# Patient Record
Sex: FEMALE | Race: WHITE | NOT HISPANIC OR LATINO | Employment: FULL TIME | URBAN - METROPOLITAN AREA
[De-identification: names, ages, dates, MRNs, and addresses within clinical notes are randomized per-mention and may not be internally consistent; named-entity substitution may affect disease eponyms.]

---

## 2023-08-10 ENCOUNTER — HOSPITAL ENCOUNTER (OUTPATIENT)
Dept: DATA CONVERSION | Facility: HOSPITAL | Age: 58
Discharge: HOME | End: 2023-08-10

## 2023-08-10 DIAGNOSIS — E03.9 HYPOTHYROIDISM, UNSPECIFIED: ICD-10-CM

## 2023-08-10 DIAGNOSIS — Z00.00 ENCOUNTER FOR GENERAL ADULT MEDICAL EXAMINATION WITHOUT ABNORMAL FINDINGS: ICD-10-CM

## 2023-08-10 LAB
ALBUMIN SERPL-MCNC: 4.4 GM/DL (ref 3.5–5)
ALBUMIN/GLOB SERPL: 1.6 RATIO (ref 1.5–3)
ALP BLD-CCNC: 119 U/L (ref 35–125)
ALT SERPL-CCNC: 35 U/L (ref 5–40)
ANION GAP SERPL CALCULATED.3IONS-SCNC: 11 MMOL/L (ref 0–19)
APPEARANCE PLAS: CLEAR
AST SERPL-CCNC: 24 U/L (ref 5–40)
BASOPHILS # BLD AUTO: 0.03 K/UL (ref 0–0.22)
BASOPHILS NFR BLD AUTO: 0.5 % (ref 0–1)
BILIRUB SERPL-MCNC: 0.5 MG/DL (ref 0.1–1.2)
BUN SERPL-MCNC: 17 MG/DL (ref 8–25)
BUN/CREAT SERPL: 21.3 RATIO (ref 8–21)
CALCIUM SERPL-MCNC: 9.9 MG/DL (ref 8.5–10.4)
CHLORIDE SERPL-SCNC: 106 MMOL/L (ref 97–107)
CHOLEST SERPL-MCNC: 250 MG/DL (ref 133–200)
CHOLEST/HDLC SERPL: 3.2 RATIO
CO2 SERPL-SCNC: 24 MMOL/L (ref 24–31)
COLOR SPUN FLD: YELLOW
CREAT SERPL-MCNC: 0.8 MG/DL (ref 0.4–1.6)
DEPRECATED RDW RBC AUTO: 42 FL (ref 37–54)
DIFFERENTIAL METHOD BLD: ABNORMAL
EOSINOPHIL # BLD AUTO: 0.28 K/UL (ref 0–0.45)
EOSINOPHIL NFR BLD: 4.9 % (ref 0–3)
ERYTHROCYTE [DISTWIDTH] IN BLOOD BY AUTOMATED COUNT: 12.9 % (ref 11.7–15)
FASTING STATUS PATIENT QL REPORTED: ABNORMAL
GFR SERPL CREATININE-BSD FRML MDRD: 86 ML/MIN/1.73 M2
GLOBULIN SER-MCNC: 2.7 G/DL (ref 1.9–3.7)
GLUCOSE SERPL-MCNC: 96 MG/DL (ref 65–99)
HCT VFR BLD AUTO: 38.9 % (ref 36–44)
HDLC SERPL-MCNC: 79 MG/DL
HGB BLD-MCNC: 13.1 GM/DL (ref 12–15)
IMM GRANULOCYTES # BLD AUTO: 0.01 K/UL (ref 0–0.1)
LDLC SERPL CALC-MCNC: 146 MG/DL (ref 65–130)
LYMPHOCYTES # BLD AUTO: 1.26 K/UL (ref 1.2–3.2)
LYMPHOCYTES NFR BLD MANUAL: 21.9 % (ref 20–40)
MCH RBC QN AUTO: 29.9 PG (ref 26–34)
MCHC RBC AUTO-ENTMCNC: 33.7 % (ref 31–37)
MCV RBC AUTO: 88.8 FL (ref 80–100)
MONOCYTES # BLD AUTO: 0.37 K/UL (ref 0–0.8)
MONOCYTES NFR BLD MANUAL: 6.4 % (ref 0–8)
NEUTROPHILS # BLD AUTO: 3.81 K/UL
NEUTROPHILS # BLD AUTO: 3.81 K/UL (ref 1.8–7.7)
NEUTROPHILS.IMMATURE NFR BLD: 0.2 % (ref 0–1)
NEUTS SEG NFR BLD: 66.1 % (ref 50–70)
NRBC BLD-RTO: 0 /100 WBC
PLATELET # BLD AUTO: 250 K/UL (ref 150–450)
PMV BLD AUTO: 9.5 CU (ref 7–12.6)
POTASSIUM SERPL-SCNC: 4.4 MMOL/L (ref 3.4–5.1)
PROT SERPL-MCNC: 7.1 G/DL (ref 5.9–7.9)
RBC # BLD AUTO: 4.38 M/UL (ref 4–4.9)
SODIUM SERPL-SCNC: 141 MMOL/L (ref 133–145)
TRIGL SERPL-MCNC: 127 MG/DL (ref 40–150)
TSH SERPL DL<=0.05 MIU/L-ACNC: 1.49 MIU/L (ref 0.27–4.2)
WBC # BLD AUTO: 5.8 K/UL (ref 4.5–11)

## 2023-08-12 ENCOUNTER — HOSPITAL ENCOUNTER (OUTPATIENT)
Dept: DATA CONVERSION | Facility: HOSPITAL | Age: 58
Discharge: HOME | End: 2023-08-12

## 2023-08-12 DIAGNOSIS — Z00.00 ENCOUNTER FOR GENERAL ADULT MEDICAL EXAMINATION WITHOUT ABNORMAL FINDINGS: ICD-10-CM

## 2023-08-12 LAB
BACTERIA UR QL AUTO: NEGATIVE
BILIRUB UR QL STRIP.AUTO: NEGATIVE
CLARITY UR: CLEAR
COLOR UR: ABNORMAL
GLUCOSE UR STRIP.AUTO-MCNC: NEGATIVE MG/DL
HGB UR QL STRIP.AUTO: 3 /HPF (ref 0–3)
HGB UR QL: NEGATIVE
HYALINE CASTS UR QL AUTO: ABNORMAL /LPF
KETONES UR QL STRIP.AUTO: NEGATIVE
LEUKOCYTE ESTERASE UR QL STRIP.AUTO: ABNORMAL
MICROSCOPIC (UA): ABNORMAL
NITRITE UR QL STRIP.AUTO: NEGATIVE
PH UR STRIP.AUTO: 6.5 [PH] (ref 4.6–8)
PROT UR STRIP.AUTO-MCNC: NEGATIVE MG/DL
SP GR UR STRIP.AUTO: 1.01 (ref 1–1.03)
SQUAMOUS UR QL AUTO: ABNORMAL /HPF
UROBILINOGEN UR QL STRIP.AUTO: NORMAL MG/DL (ref 0–1)
WBC #/AREA URNS AUTO: 14 /HPF (ref 0–3)

## 2023-08-21 ENCOUNTER — HOSPITAL ENCOUNTER (OUTPATIENT)
Dept: DATA CONVERSION | Facility: HOSPITAL | Age: 58
End: 2023-08-21

## 2023-08-29 PROBLEM — M25.579 ANKLE PAIN: Status: ACTIVE | Noted: 2023-08-29

## 2023-08-29 PROBLEM — G47.33 OSA (OBSTRUCTIVE SLEEP APNEA): Status: ACTIVE | Noted: 2022-09-08

## 2023-08-29 PROBLEM — M84.30XA STRESS FRACTURE: Status: ACTIVE | Noted: 2023-08-29

## 2023-08-29 PROBLEM — L30.9 DERMATITIS: Status: ACTIVE | Noted: 2023-08-29

## 2023-08-29 PROBLEM — M85.80 OSTEOPENIA: Status: ACTIVE | Noted: 2023-08-29

## 2023-08-29 PROBLEM — M19.072 ARTHRITIS OF LEFT FOOT: Status: ACTIVE | Noted: 2021-06-10

## 2023-08-29 PROBLEM — M79.89 RIGHT LEG SWELLING: Status: ACTIVE | Noted: 2022-12-07

## 2023-08-29 PROBLEM — L84 CALLUS OF FOOT: Status: ACTIVE | Noted: 2021-01-21

## 2023-08-29 PROBLEM — N60.01 CYST OF RIGHT BREAST: Status: ACTIVE | Noted: 2023-08-29

## 2023-08-29 PROBLEM — R79.82 CRP ELEVATED: Status: ACTIVE | Noted: 2023-02-02

## 2023-08-29 PROBLEM — L82.1 SEBORRHEIC KERATOSES: Status: ACTIVE | Noted: 2022-08-15

## 2023-08-29 PROBLEM — L03.115 CELLULITIS OF RIGHT LOWER LEG: Status: ACTIVE | Noted: 2023-01-24

## 2023-08-29 PROBLEM — E03.9 HYPOTHYROIDISM: Status: ACTIVE | Noted: 2018-05-21

## 2023-08-29 PROBLEM — R52 BODY ACHES: Status: ACTIVE | Noted: 2023-01-24

## 2023-08-29 PROBLEM — R70.0 ELEVATED SED RATE: Status: ACTIVE | Noted: 2023-02-02

## 2023-08-29 PROBLEM — I87.2 VENOUS INSUFFICIENCY: Status: ACTIVE | Noted: 2018-05-21

## 2023-08-29 PROBLEM — K76.0 FATTY LIVER: Status: ACTIVE | Noted: 2023-08-29

## 2023-08-29 PROBLEM — E78.5 HYPERLIPIDEMIA: Status: ACTIVE | Noted: 2023-08-29

## 2023-08-29 PROBLEM — R53.83 FATIGUE: Status: ACTIVE | Noted: 2019-05-23

## 2023-08-29 PROBLEM — G47.00 INSOMNIA: Status: ACTIVE | Noted: 2020-05-28

## 2023-08-29 PROBLEM — M54.42 LUMBAGO WITH SCIATICA, LEFT SIDE: Status: ACTIVE | Noted: 2018-05-21

## 2023-08-29 PROBLEM — M25.50 ARTHRALGIA: Status: ACTIVE | Noted: 2023-01-24

## 2023-08-29 PROBLEM — L70.9 ACNE: Status: ACTIVE | Noted: 2020-02-04

## 2023-08-29 PROBLEM — E06.3 HASHIMOTO'S DISEASE: Status: ACTIVE | Noted: 2023-01-24

## 2023-08-29 PROBLEM — E55.9 VITAMIN D DEFICIENCY: Status: ACTIVE | Noted: 2023-08-29

## 2023-08-29 PROBLEM — R79.9 ABNORMAL BLOOD CHEMISTRY: Status: ACTIVE | Noted: 2023-08-29

## 2023-08-29 RX ORDER — LEVOTHYROXINE SODIUM 125 UG/1
125 TABLET ORAL
COMMUNITY
Start: 2013-01-17 | End: 2023-10-24

## 2023-08-29 RX ORDER — ERGOCALCIFEROL 1.25 MG/1
1.25 CAPSULE ORAL
COMMUNITY
Start: 2018-03-14

## 2023-08-29 RX ORDER — CALCIUM CARBONATE 200(500)MG
500 TABLET,CHEWABLE ORAL DAILY
COMMUNITY
Start: 2019-05-23 | End: 2023-12-07 | Stop reason: WASHOUT

## 2023-08-29 RX ORDER — BIOTIN 1 MG
1 TABLET ORAL DAILY
COMMUNITY

## 2023-10-28 ENCOUNTER — LAB (OUTPATIENT)
Dept: LAB | Facility: LAB | Age: 58
End: 2023-10-28
Payer: COMMERCIAL

## 2023-10-28 DIAGNOSIS — R76.8 OTHER SPECIFIED ABNORMAL IMMUNOLOGICAL FINDINGS IN SERUM: ICD-10-CM

## 2023-10-28 DIAGNOSIS — E55.9 VITAMIN D DEFICIENCY, UNSPECIFIED: Primary | ICD-10-CM

## 2023-10-28 DIAGNOSIS — R79.82 ELEVATED C-REACTIVE PROTEIN (CRP): ICD-10-CM

## 2023-10-28 DIAGNOSIS — L30.9 DERMATITIS, UNSPECIFIED: ICD-10-CM

## 2023-10-28 DIAGNOSIS — Z11.59 ENCOUNTER FOR SCREENING FOR OTHER VIRAL DISEASES: ICD-10-CM

## 2023-10-28 LAB
25(OH)D3 SERPL-MCNC: 51 NG/ML (ref 31–100)
ALBUMIN SERPL-MCNC: 4.6 G/DL (ref 3.5–5)
ALP BLD-CCNC: 136 U/L (ref 35–125)
ALT SERPL-CCNC: 57 U/L (ref 5–40)
ANION GAP SERPL CALC-SCNC: 13 MMOL/L
AST SERPL-CCNC: 34 U/L (ref 5–40)
BASOPHILS # BLD AUTO: 0.05 X10*3/UL (ref 0–0.1)
BASOPHILS NFR BLD AUTO: 0.9 %
BILIRUB SERPL-MCNC: 0.5 MG/DL (ref 0.1–1.2)
BUN SERPL-MCNC: 21 MG/DL (ref 8–25)
C3 SERPL-MCNC: 188 MG/DL (ref 87–200)
CALCIUM SERPL-MCNC: 10.1 MG/DL (ref 8.5–10.4)
CHLORIDE SERPL-SCNC: 102 MMOL/L (ref 97–107)
CK SERPL-CCNC: 85 U/L (ref 24–195)
CO2 SERPL-SCNC: 23 MMOL/L (ref 24–31)
CREAT SERPL-MCNC: 0.8 MG/DL (ref 0.4–1.6)
CRP SERPL-MCNC: 0.4 MG/DL (ref 0–2)
DAT-COMPLEMENT: NORMAL
EOSINOPHIL # BLD AUTO: 0.22 X10*3/UL (ref 0–0.7)
EOSINOPHIL NFR BLD AUTO: 4 %
ERYTHROCYTE [DISTWIDTH] IN BLOOD BY AUTOMATED COUNT: 12.7 % (ref 11.5–14.5)
ERYTHROCYTE [SEDIMENTATION RATE] IN BLOOD BY WESTERGREN METHOD: 27 MM/H (ref 0–30)
GFR SERPL CREATININE-BSD FRML MDRD: 86 ML/MIN/1.73M*2
GLUCOSE SERPL-MCNC: 100 MG/DL (ref 65–99)
HCT VFR BLD AUTO: 40.2 % (ref 36–46)
HCV AB SER QL: NONREACTIVE
HGB BLD-MCNC: 13.4 G/DL (ref 12–16)
HIV 1+2 AB+HIV1 P24 AG SERPL QL IA: NONREACTIVE
IMM GRANULOCYTES # BLD AUTO: 0.02 X10*3/UL (ref 0–0.7)
IMM GRANULOCYTES NFR BLD AUTO: 0.4 % (ref 0–0.9)
LYMPHOCYTES # BLD AUTO: 1.49 X10*3/UL (ref 1.2–4.8)
LYMPHOCYTES NFR BLD AUTO: 27 %
MCH RBC QN AUTO: 30.4 PG (ref 26–34)
MCHC RBC AUTO-ENTMCNC: 33.3 G/DL (ref 32–36)
MCV RBC AUTO: 91 FL (ref 80–100)
MONOCYTES # BLD AUTO: 0.43 X10*3/UL (ref 0.1–1)
MONOCYTES NFR BLD AUTO: 7.8 %
NEUTROPHILS # BLD AUTO: 3.31 X10*3/UL (ref 1.2–7.7)
NEUTROPHILS NFR BLD AUTO: 59.9 %
NRBC BLD-RTO: 0 /100 WBCS (ref 0–0)
PLATELET # BLD AUTO: 261 X10*3/UL (ref 150–450)
PMV BLD AUTO: 9.7 FL (ref 7.5–11.5)
POTASSIUM SERPL-SCNC: 4.4 MMOL/L (ref 3.4–5.1)
PROT SERPL-MCNC: 6.9 G/DL (ref 5.9–7.9)
PROT UR-ACNC: <4 MG/DL (ref 5–25)
RBC # BLD AUTO: 4.41 X10*6/UL (ref 4–5.2)
SODIUM SERPL-SCNC: 138 MMOL/L (ref 133–145)
THYROPEROXIDASE AB SERPL-ACNC: <28 IU/ML
URATE SERPL-MCNC: 5.1 MG/DL (ref 2.5–6.8)
WBC # BLD AUTO: 5.5 X10*3/UL (ref 4.4–11.3)

## 2023-10-28 PROCEDURE — 85652 RBC SED RATE AUTOMATED: CPT

## 2023-10-28 PROCEDURE — 82652 VIT D 1 25-DIHYDROXY: CPT

## 2023-10-28 PROCEDURE — 87350 HEPATITIS BE AG IA: CPT

## 2023-10-28 PROCEDURE — 86803 HEPATITIS C AB TEST: CPT

## 2023-10-28 PROCEDURE — 85025 COMPLETE CBC W/AUTO DIFF WBC: CPT

## 2023-10-28 PROCEDURE — 86225 DNA ANTIBODY NATIVE: CPT

## 2023-10-28 PROCEDURE — 82164 ANGIOTENSIN I ENZYME TEST: CPT

## 2023-10-28 PROCEDURE — 86036 ANCA SCREEN EACH ANTIBODY: CPT

## 2023-10-28 PROCEDURE — 36415 COLL VENOUS BLD VENIPUNCTURE: CPT

## 2023-10-28 PROCEDURE — 86160 COMPLEMENT ANTIGEN: CPT

## 2023-10-28 PROCEDURE — 86800 THYROGLOBULIN ANTIBODY: CPT

## 2023-10-28 PROCEDURE — 84166 PROTEIN E-PHORESIS/URINE/CSF: CPT

## 2023-10-28 PROCEDURE — 86146 BETA-2 GLYCOPROTEIN ANTIBODY: CPT

## 2023-10-28 PROCEDURE — 86618 LYME DISEASE ANTIBODY: CPT

## 2023-10-28 PROCEDURE — 86235 NUCLEAR ANTIGEN ANTIBODY: CPT

## 2023-10-28 PROCEDURE — 86258 DGP ANTIBODY EACH IG CLASS: CPT

## 2023-10-28 PROCEDURE — 86364 TISS TRNSGLTMNASE EA IG CLAS: CPT

## 2023-10-28 PROCEDURE — 86147 CARDIOLIPIN ANTIBODY EA IG: CPT

## 2023-10-28 PROCEDURE — 82550 ASSAY OF CK (CPK): CPT

## 2023-10-28 PROCEDURE — 86060 ANTISTREPTOLYSIN O TITER: CPT

## 2023-10-28 PROCEDURE — 84550 ASSAY OF BLOOD/URIC ACID: CPT

## 2023-10-28 PROCEDURE — 86880 COOMBS TEST DIRECT: CPT

## 2023-10-28 PROCEDURE — 86335 IMMUNFIX E-PHORSIS/URINE/CSF: CPT

## 2023-10-28 PROCEDURE — 87389 HIV-1 AG W/HIV-1&-2 AB AG IA: CPT

## 2023-10-28 PROCEDURE — 82306 VITAMIN D 25 HYDROXY: CPT

## 2023-10-28 PROCEDURE — 82784 ASSAY IGA/IGD/IGG/IGM EACH: CPT

## 2023-10-28 PROCEDURE — 89240 UNLISTED MISC PATH TEST: CPT | Performed by: INTERNAL MEDICINE

## 2023-10-28 PROCEDURE — 86140 C-REACTIVE PROTEIN: CPT

## 2023-10-28 PROCEDURE — 86325 OTHER IMMUNOELECTROPHORESIS: CPT

## 2023-10-28 PROCEDURE — 84156 ASSAY OF PROTEIN URINE: CPT

## 2023-10-28 PROCEDURE — 80053 COMPREHEN METABOLIC PANEL: CPT

## 2023-10-28 PROCEDURE — 86376 MICROSOMAL ANTIBODY EACH: CPT

## 2023-10-29 LAB
ASO AB SERPL-ACNC: 187 IU/ML (ref 0–250)
B2 GLYCOPROT1 IGA SER-ACNC: <0.6 U/ML
B2 GLYCOPROT1 IGG SER-ACNC: <1.4 U/ML
B2 GLYCOPROT1 IGM SER-ACNC: 1.2 U/ML
CARDIOLIPIN IGA SERPL-ACNC: <0.5 APL U/ML
CARDIOLIPIN IGG SER IA-ACNC: <1.6 GPL U/ML
CARDIOLIPIN IGM SER IA-ACNC: 0.8 MPL U/ML
DSDNA AB SER-ACNC: <1 IU/ML
ENA SM AB SER IA-ACNC: <0.2 AI
GLIADIN PEPTIDE IGA SER IA-ACNC: <1 U/ML
GLIADIN PEPTIDE IGG SER IA-ACNC: <1 U/ML
IGG SERPL-MCNC: 873 MG/DL (ref 700–1600)
IGG1 SER-MCNC: 652 MG/DL (ref 490–1140)
IGG2 SER-MCNC: 275 MG/DL (ref 150–640)
IGG3 SER-MCNC: 32 MG/DL (ref 11–85)
IGG4 SER-MCNC: 47 MG/DL (ref 3–200)
TTG IGA SER IA-ACNC: <1 U/ML
TTG IGG SER IA-ACNC: <1 U/ML

## 2023-10-30 NOTE — ADDENDUM NOTE
Addended by: MIMI CAMPBELL on: 10/30/2023 12:12 AM     Modules accepted: Orders     Patient believes she has a sinus infection. She would like something called in to provide relief.

## 2023-10-31 LAB
ACE SERPL-CCNC: 36 U/L (ref 16–85)
HBV E AG SERPL QL IA: NEGATIVE
THYROGLOB AB SERPL-ACNC: <0.9 IU/ML (ref 0–4)

## 2023-11-01 LAB
1,25(OH)2D3 SERPL-MCNC: 83.4 PG/ML (ref 19.9–79.3)
ANCA AB PATTERN SER IF-IMP: NORMAL
ANCA IGG TITR SER IF: NORMAL {TITER}
B BURGDOR.VLSE1+PEPC10 AB SER IA-ACNC: 0.26 IV

## 2023-11-02 ENCOUNTER — HOSPITAL ENCOUNTER (OUTPATIENT)
Dept: RESPIRATORY THERAPY | Facility: HOSPITAL | Age: 58
Setting detail: THERAPIES SERIES
Discharge: STILL ADMITTED OR ADMINISTRATIVE | End: 2023-11-02
Payer: COMMERCIAL

## 2023-11-02 ENCOUNTER — HOSPITAL ENCOUNTER (OUTPATIENT)
Dept: CARDIOLOGY | Facility: HOSPITAL | Age: 58
Discharge: HOME | End: 2023-11-02
Payer: COMMERCIAL

## 2023-11-02 DIAGNOSIS — R79.82 CRP ELEVATED: ICD-10-CM

## 2023-11-02 DIAGNOSIS — R76.8 POSITIVE SM/RNP ANTIBODY: ICD-10-CM

## 2023-11-02 DIAGNOSIS — R79.82 ELEVATED C-REACTIVE PROTEIN (CRP): ICD-10-CM

## 2023-11-02 DIAGNOSIS — R94.31 ABNORMAL ELECTROCARDIOGRAM (ECG) (EKG): ICD-10-CM

## 2023-11-02 DIAGNOSIS — L03.90 CELLULITIS, UNSPECIFIED CELLULITIS SITE: ICD-10-CM

## 2023-11-02 LAB — EJECTION FRACTION APICAL 4 CHAMBER: 53.3

## 2023-11-02 PROCEDURE — 93306 TTE W/DOPPLER COMPLETE: CPT | Performed by: INTERNAL MEDICINE

## 2023-11-02 PROCEDURE — 93306 TTE W/DOPPLER COMPLETE: CPT

## 2023-11-02 PROCEDURE — 94726 PLETHYSMOGRAPHY LUNG VOLUMES: CPT

## 2023-11-03 ENCOUNTER — OFFICE VISIT (OUTPATIENT)
Dept: PRIMARY CARE | Facility: CLINIC | Age: 58
End: 2023-11-03
Payer: COMMERCIAL

## 2023-11-03 VITALS
DIASTOLIC BLOOD PRESSURE: 80 MMHG | WEIGHT: 219 LBS | HEART RATE: 86 BPM | BODY MASS INDEX: 32.34 KG/M2 | OXYGEN SATURATION: 99 % | SYSTOLIC BLOOD PRESSURE: 122 MMHG

## 2023-11-03 DIAGNOSIS — L98.9 SKIN LESION: Primary | ICD-10-CM

## 2023-11-03 LAB
ALBUMIN MFR UR ELPH: 50.6 %
ALPHA1 GLOB MFR UR ELPH: 14.9 %
ALPHA2 GLOB MFR UR ELPH: 10 %
B-GLOBULIN MFR UR ELPH: 9.3 %
GAMMA GLOB MFR UR ELPH: 15.2 %
IMMUNOFIXATION COMMENT: NORMAL
PATH REVIEW - URINE IMMUNOFIXATION: NORMAL
PATH REVIEW-URINE PROTEIN ELECTROPHORESIS: NORMAL
URINE ELECTROPHORESIS COMMENT: NORMAL

## 2023-11-03 PROCEDURE — 1036F TOBACCO NON-USER: CPT | Performed by: STUDENT IN AN ORGANIZED HEALTH CARE EDUCATION/TRAINING PROGRAM

## 2023-11-03 PROCEDURE — 99213 OFFICE O/P EST LOW 20 MIN: CPT | Performed by: STUDENT IN AN ORGANIZED HEALTH CARE EDUCATION/TRAINING PROGRAM

## 2023-11-03 ASSESSMENT — PAIN SCALES - GENERAL: PAINLEVEL: 0-NO PAIN

## 2023-11-03 ASSESSMENT — PATIENT HEALTH QUESTIONNAIRE - PHQ9
SUM OF ALL RESPONSES TO PHQ9 QUESTIONS 1 AND 2: 0
1. LITTLE INTEREST OR PLEASURE IN DOING THINGS: NOT AT ALL
2. FEELING DOWN, DEPRESSED OR HOPELESS: NOT AT ALL

## 2023-11-03 NOTE — PROGRESS NOTES
GENERAL PROGRESS NOTE    Chief Complaint   Patient presents with    Spot on chest       HPI  Beverley Waggoner is a 58 y.o. female that presents today with a small red spot mid-chest. Slightly raised. No change since first noticing. No pain, itching, or irritation. Has had she thinks pre-cancerous lesions removed in the past, last a few years ago. Wanted to have looked at here before scheduling with dermatology.     Vital signs   /80   Pulse 86   Wt 99.3 kg (219 lb)   SpO2 99%   BMI 32.34 kg/m²      Current Outpatient Medications   Medication Sig Dispense Refill    biotin 1 mg tablet Take 1 tablet (1 mg) by mouth once daily.      CALCIUM CARBONATE-VITAMIN D3 ORAL Take 1 tablet by mouth once daily.      levothyroxine (Synthroid, Levoxyl) 125 mcg tablet TAKE 1 TABLET BY MOUTH DAILY 90 tablet 1    MAGNESIUM OXIDE ORAL Take by mouth once daily.      multivit-min/ferrous fumarate (MULTI VITAMIN ORAL) Take by mouth once daily.      pyridoxine HCl, vitamin B6, (VITAMIN B-6 ORAL) once daily.      calcium carbonate (Tums) 200 mg calcium chewable tablet Chew 1 tablet (500 mg) once daily.      ergocalciferol (Vitamin D-2) 1.25 MG (92095 UT) capsule Take 1 capsule (1,250 mcg) by mouth 1 (one) time per week.      fish oil concentrate (Omega-3) 120-180 mg capsule Take 1 capsule (1 g) by mouth once daily.      TURMERIC ORAL Take 500 mg by mouth once daily.       No current facility-administered medications for this visit.     PHYSICAL EXAM  Nursing notes and vitals reviewed  General appearance - AAOx3, non distressed  CVS - Warm and well perfused  RESP - No respiratory distress  PSYCH - Normal thought content, fluent and appropriate speech  SKIN - Mid chest with small approx 5mm circular, erythematous lesion. Very slightly risen. No scaling, no central clearing.     Assessment/Plan   1. Skin lesion  Reassured  does not have any concerning features but given her reported hx of pre-cancerous lesions, would recommend she see dermatology for skin check and to have this lesion looked at to be sure. Does not look like tinea given lack of central clearing. Does not seem to be eczema/dermatitis given absence of pruritus, history of similar, and its appearance. Will monitor without any recommended treatment at this time and get dermatology opinion if still present when she goes in for skin check.     Lashanda Ramirez MD  11/03/23  9:59 AM

## 2023-11-07 LAB — C1Q SERPL-MCNC: 14.7 MG/DL (ref 10.3–20.5)

## 2023-11-09 ENCOUNTER — APPOINTMENT (OUTPATIENT)
Dept: RHEUMATOLOGY | Facility: CLINIC | Age: 58
End: 2023-11-09
Payer: COMMERCIAL

## 2023-11-13 LAB — SCAN RESULT: NORMAL

## 2023-12-07 ENCOUNTER — OFFICE VISIT (OUTPATIENT)
Dept: RHEUMATOLOGY | Facility: CLINIC | Age: 58
End: 2023-12-07
Payer: COMMERCIAL

## 2023-12-07 VITALS
HEART RATE: 86 BPM | BODY MASS INDEX: 32.91 KG/M2 | OXYGEN SATURATION: 97 % | HEIGHT: 69 IN | WEIGHT: 222.22 LBS | SYSTOLIC BLOOD PRESSURE: 132 MMHG | DIASTOLIC BLOOD PRESSURE: 78 MMHG

## 2023-12-07 DIAGNOSIS — R76.8 POSITIVE SM/RNP ANTIBODY: ICD-10-CM

## 2023-12-07 DIAGNOSIS — M06.09 POLYARTHRITIS WITH NEGATIVE RHEUMATOID FACTOR (MULTI): ICD-10-CM

## 2023-12-07 DIAGNOSIS — M35.9 UNDIFFERENTIATED CONNECTIVE TISSUE DISEASE (MULTI): ICD-10-CM

## 2023-12-07 DIAGNOSIS — E55.9 VITAMIN D DEFICIENCY: ICD-10-CM

## 2023-12-07 DIAGNOSIS — B99.9 INFECTION: Primary | ICD-10-CM

## 2023-12-07 PROCEDURE — 99215 OFFICE O/P EST HI 40 MIN: CPT | Performed by: INTERNAL MEDICINE

## 2023-12-07 PROCEDURE — 1036F TOBACCO NON-USER: CPT | Performed by: INTERNAL MEDICINE

## 2023-12-07 RX ORDER — METHYLPREDNISOLONE 4 MG/1
TABLET ORAL
Qty: 21 TABLET | Refills: 0 | Status: SHIPPED | OUTPATIENT
Start: 2023-12-07

## 2023-12-07 RX ORDER — SULFAMETHOXAZOLE AND TRIMETHOPRIM 800; 160 MG/1; MG/1
1 TABLET ORAL 2 TIMES DAILY
Qty: 14 TABLET | Refills: 0 | Status: SHIPPED | OUTPATIENT
Start: 2023-12-07 | End: 2023-12-14

## 2023-12-07 ASSESSMENT — ROUTINE ASSESSMENT OF PATIENT INDEX DATA (RAPID3)
WALK_KILOMETERS: WITH SOME DIFFICULTY
FEELINGS_ANXIETY_NERVOUS: WITH SOME DIFFICULTY
WASH_DRY_BODY: WITHOUT ANY DIFFICULTY
ON A SCALE OF ONE TO TEN, HOW MUCH PAIN HAVE YOU HAD BECAUSE OF YOUR CONDITION OVER THE PAST WEEK?: 0.5
TOTAL RAPID3 SCORE: 2.5
ON A SCALE OF ONE TO TEN, HOW MUCH PAIN HAVE YOU HAD BECAUSE OF YOUR CONDITION OVER THE PAST WEEK?: 0.5
ON A SCALE OF ONE TO TEN, CONSIDERING ALL THE WAYS IN WHICH ILLNESS AND HEALTH CONDITIONS MAY AFFECT YOU AT THIS TIME, PLEASE INDICATE BELOW HOW YOU ARE DOING:: 1
SUM OF QUESTIONS A TO J: 3
PICK_CLOTHES_OFF_FLOOR: WITHOUT ANY DIFFICULTY
WEIGHTED_TOTAL_SCORE: 0.83
TURN_FAUCETS_OFF: WITHOUT ANY DIFFICULTY
LIFT_CUP_TO_MOUTH: WITHOUT ANY DIFFICULTY
SEVERITY_SCORE: 0
DRESS_YOURSELF: WITHOUT ANY DIFFICULTY
PARTIPATE_RECREATIONAL_ACTIVITIES: WITH SOME DIFFICULTY
FEELINGS_DEPRESSION: WITH SOME DIFFICULTY
GOOD_NIGHTS_SLEEP: WITH MUCH DIFFICULTY
IN_OUT_BED: WITHOUT ANY DIFFICULTY
IN_OUT_TRANSPORT: WITHOUT ANY DIFFICULTY
WALK_FLAT_GROUND: WITH SOME DIFFICULTY
ON A SCALE OF ONE TO TEN, CONSIDERING ALL THE WAYS IN WHICH ILLNESS AND HEALTH CONDITIONS MAY AFFECT YOU AT THIS TIME, PLEASE INDICATE BELOW HOW YOU ARE DOING:: 1
FN_SCORE: 1

## 2023-12-07 ASSESSMENT — ENCOUNTER SYMPTOMS
DEPRESSION: 0
OCCASIONAL FEELINGS OF UNSTEADINESS: 0
LOSS OF SENSATION IN FEET: 0

## 2023-12-07 ASSESSMENT — PATIENT HEALTH QUESTIONNAIRE - PHQ9
SUM OF ALL RESPONSES TO PHQ9 QUESTIONS 1 AND 2: 0
2. FEELING DOWN, DEPRESSED OR HOPELESS: NOT AT ALL
1. LITTLE INTEREST OR PLEASURE IN DOING THINGS: NOT AT ALL

## 2023-12-07 ASSESSMENT — PAIN - FUNCTIONAL ASSESSMENT: PAIN_FUNCTIONAL_ASSESSMENT: 0-10

## 2023-12-07 ASSESSMENT — PAIN SCALES - GENERAL: PAINLEVEL_OUTOF10: 1

## 2023-12-07 NOTE — PROGRESS NOTES
"          [facility]/  Rheumatology  [address], [address]  [place] [address]  Phone: [phone]  Fax: [phone]    Rheumatology Progress Note 12/7/23    Beverley Waggoner is a 58 y.o. female here for   Chief Complaint   Patient presents with    Follow-up     labs       Last Visit: 8/3/23    Rheum Hx      Previous Tx    Health Maintenance  DXA  Malignancy Hx-none  Immunization History   Administered Date(s) Administered    Tdap vaccine, age 7 year and older (BOOSTRIX) 05/21/2018    Zoster vaccine, recombinant, adult (SHINGRIX) 06/04/2021, 09/29/2021          Past Medical History:   Diagnosis Date    Hyperthyroidism     Vitamin D deficiency       Past Surgical History:   Procedure Laterality Date    FOOT TENDON TRANSFER Right     TOE SURGERY        Current Outpatient Medications   Medication Sig Dispense Refill    biotin 1 mg tablet Take 1 tablet (1 mg) by mouth once daily.      CALCIUM CARBONATE-VITAMIN D3 ORAL Take 1 tablet by mouth once daily.      ergocalciferol (Vitamin D-2) 1.25 MG (62130 UT) capsule Take 1 capsule (1,250 mcg) by mouth 1 (one) time per week.      levothyroxine (Synthroid, Levoxyl) 125 mcg tablet TAKE 1 TABLET BY MOUTH DAILY 90 tablet 1    MAGNESIUM OXIDE ORAL Take by mouth once daily.      multivit-min/ferrous fumarate (MULTI VITAMIN ORAL) Take by mouth once daily.      pyridoxine HCl, vitamin B6, (VITAMIN B-6 ORAL) once daily.      methylPREDNISolone (Medrol Dospak) 4 mg tablets Follow schedule on package instructions 21 tablet 0     No current facility-administered medications for this visit.      Allergies   Allergen Reactions    Adhesive Tape-Silicones Itching     Redness at site of strips from echo        Vitals:    12/07/23 1100   BP: 132/78   Pulse: 86   SpO2: 97%   Weight: 101 kg (222 lb 3.6 oz)   Height: 1.753 m (5' 9\")     Pain Assessment Pain Score: 1, Pain Location: Foot     Rapid 3  Function Score (FN): 1  Pain Score (PN) (0-10): 0.5  Patient Global " (PTGL) (0-10): 1  Rapid3 Score: 2.5  RAPID3 Weighted Score: 0.83     Workup  Component      Latest Ref Rng 10/28/2023   WBC      4.4 - 11.3 x10*3/uL 5.5    nRBC      0.0 - 0.0 /100 WBCs 0.0    RBC      4.00 - 5.20 x10*6/uL 4.41    HEMOGLOBIN      12.0 - 16.0 g/dL 13.4    HEMATOCRIT      36.0 - 46.0 % 40.2    MCV      80 - 100 fL 91    MCH      26.0 - 34.0 pg 30.4    MCHC      32.0 - 36.0 g/dL 33.3    RED CELL DISTRIBUTION WIDTH      11.5 - 14.5 % 12.7    Platelets      150 - 450 x10*3/uL 261    MEAN PLATELET VOLUME      7.5 - 11.5 fL 9.7    Neutrophils %      40.0 - 80.0 % 59.9    Immature Granulocytes %, Automated      0.0 - 0.9 % 0.4    Lymphocytes %      13.0 - 44.0 % 27.0    Monocytes %      2.0 - 10.0 % 7.8    Eosinophils %      0.0 - 6.0 % 4.0    Basophils %      0.0 - 2.0 % 0.9    Neutrophils Absolute      1.20 - 7.70 x10*3/uL 3.31    Immature Granulocytes Absolute, Automated      0.00 - 0.70 x10*3/uL 0.02    Lymphocytes Absolute      1.20 - 4.80 x10*3/uL 1.49    Monocytes Absolute      0.10 - 1.00 x10*3/uL 0.43    Eosinophils Absolute      0.00 - 0.70 x10*3/uL 0.22    Basophils Absolute      0.00 - 0.10 x10*3/uL 0.05    GLUCOSE      65 - 99 mg/dL 100 (H)    SODIUM      133 - 145 mmol/L 138    POTASSIUM      3.4 - 5.1 mmol/L 4.4    CHLORIDE      97 - 107 mmol/L 102    Bicarbonate      24 - 31 mmol/L 23 (L)    Blood Urea Nitrogen      8 - 25 mg/dL 21    Creatinine      0.40 - 1.60 mg/dL 0.80    EGFR      >60 mL/min/1.73m*2 86    Calcium      8.5 - 10.4 mg/dL 10.1    Albumin      3.5 - 5.0 g/dL 4.6    Alkaline Phosphatase      35 - 125 U/L 136 (H)    Total Protein      5.9 - 7.9 g/dL 6.9    AST      5 - 40 U/L 34    Bilirubin Total      0.1 - 1.2 mg/dL 0.5    ALT      5 - 40 U/L 57 (H)    Anion Gap      <=19 mmol/L 13    Albumin %      % 50.6    Alpha 1 Globulin %      % 14.9    Alpha 2 Globulin %      % 10.0    Beta Globulin %      % 9.3    Gamma Globulin %      % 15.2    Urine Electrophoresis Comment Normal.     Path Review-Urine Protein Electrophoresis  Reviewed and approved by ARIELLA MCDONALD on 11/3/23 at 8:09 AM.    Path Review - Urine Immunofixation Reviewed and approved by ARIELLA MCDONALD on 11/3/23 at 8:09 AM.    Immunofixation Comment No monoclonal protein detected by immunofixation.    IgG 1      490 - 1,140 mg/dL 652    IgG 2      150 - 640 mg/dL 275    IgG 3      11 - 85 mg/dL 32    IgG 4      3 - 200 mg/dL 47    IgG      700 - 1,600 mg/dL 873    Tissue Transglutaminase, IgA      <15.0 U/mL <1.0    Tissue Transglutamase IgG      <15.0 U/mL <1.0    Deamidated Gliadin Antibody IgA      <15.0 U/mL <1.0    Deamidated Gliadin Antibody IgG      <15.0 U/mL <1.0    Beta-2 Glyco 1 IgG      <20.0 U/mL <1.4    Beta-2 Glyco 1 IgA      <20.0 U/mL <0.6    Beta 2 Glyco 1 IgM      <20.0 U/mL 1.2    Anticardiolipin IgA      <20.0 APL U/mL <0.5    Anticardiolipin IgG      <20.0 GPL U/mL <1.6    Anticardiolipin IgM      <20.0 MPL U/mL 0.8    ANCA IFA Titer      <1:20  <1:20    ANCA IFA Pattern      None Detected  None Detected    Anti-SM      <1.0 AI <0.2    MARCELINO COMPLEMENT NEG    URIC ACID      2.5 - 6.8 mg/dL 5.1    Creatine Kinase      24 - 195 U/L 85    C1Q Complement      10.3 - 20.5 mg/dL 14.7    C3 Complement      87 - 200 mg/dL 188    Sed Rate      0 - 30 mm/h 27    Thyroid Peroxidase (TPO) Antibody      <=60 IU/mL <28    Anti-DNA (DS)      <5.0 IU/mL <1.0    C-Reactive Protein      0.00 - 2.00 mg/dL 0.40    HIV 1/2 Antigen/Antibody Screen with Reflex to Confirmation      Nonreactive  Nonreactive    Vit D, 1,25-Dihydroxy      19.9 - 79.3 pg/mL 83.4 (H)    Hepatitis C AB      Nonreactive  Nonreactive    Hep Be Antigen      Negative  Negative    B. burgdorferi VlsE1/pepC10 Abs, CARL      <=0.90 IV 0.26    Angio Converting Enzyme      16 - 85 U/L 36    Anti-Thyroglobulin AB      0.0 - 4.0 IU/mL <0.9    Vitamin D, 25-Hydroxy, Total      31 - 100 ng/mL 51    ASO      0 - 250 IU/mL 187    Total Protein, Urine      5 - 25 mg/dL <4  (L)    Extra Tube    Scan Result See Scanned Result      Component      Latest Ref Rng 10/30/2023   WBC      4.4 - 11.3 x10*3/uL    nRBC      0.0 - 0.0 /100 WBCs    RBC      4.00 - 5.20 x10*6/uL    HEMOGLOBIN      12.0 - 16.0 g/dL    HEMATOCRIT      36.0 - 46.0 %    MCV      80 - 100 fL    MCH      26.0 - 34.0 pg    MCHC      32.0 - 36.0 g/dL    RED CELL DISTRIBUTION WIDTH      11.5 - 14.5 %    Platelets      150 - 450 x10*3/uL    MEAN PLATELET VOLUME      7.5 - 11.5 fL    Neutrophils %      40.0 - 80.0 %    Immature Granulocytes %, Automated      0.0 - 0.9 %    Lymphocytes %      13.0 - 44.0 %    Monocytes %      2.0 - 10.0 %    Eosinophils %      0.0 - 6.0 %    Basophils %      0.0 - 2.0 %    Neutrophils Absolute      1.20 - 7.70 x10*3/uL    Immature Granulocytes Absolute, Automated      0.00 - 0.70 x10*3/uL    Lymphocytes Absolute      1.20 - 4.80 x10*3/uL    Monocytes Absolute      0.10 - 1.00 x10*3/uL    Eosinophils Absolute      0.00 - 0.70 x10*3/uL    Basophils Absolute      0.00 - 0.10 x10*3/uL    GLUCOSE      65 - 99 mg/dL    SODIUM      133 - 145 mmol/L    POTASSIUM      3.4 - 5.1 mmol/L    CHLORIDE      97 - 107 mmol/L    Bicarbonate      24 - 31 mmol/L    Blood Urea Nitrogen      8 - 25 mg/dL    Creatinine      0.40 - 1.60 mg/dL    EGFR      >60 mL/min/1.73m*2    Calcium      8.5 - 10.4 mg/dL    Albumin      3.5 - 5.0 g/dL    Alkaline Phosphatase      35 - 125 U/L    Total Protein      5.9 - 7.9 g/dL    AST      5 - 40 U/L    Bilirubin Total      0.1 - 1.2 mg/dL    ALT      5 - 40 U/L    Anion Gap      <=19 mmol/L    Albumin %      %    Alpha 1 Globulin %      %    Alpha 2 Globulin %      %    Beta Globulin %      %    Gamma Globulin %      %    Urine Electrophoresis Comment    Path Review-Urine Protein Electrophoresis     Path Review - Urine Immunofixation    Immunofixation Comment    IgG 1      490 - 1,140 mg/dL    IgG 2      150 - 640 mg/dL    IgG 3      11 - 85 mg/dL    IgG 4      3 - 200 mg/dL     IgG      700 - 1,600 mg/dL    Tissue Transglutaminase, IgA      <15.0 U/mL    Tissue Transglutamase IgG      <15.0 U/mL    Deamidated Gliadin Antibody IgA      <15.0 U/mL    Deamidated Gliadin Antibody IgG      <15.0 U/mL    Beta-2 Glyco 1 IgG      <20.0 U/mL    Beta-2 Glyco 1 IgA      <20.0 U/mL    Beta 2 Glyco 1 IgM      <20.0 U/mL    Anticardiolipin IgA      <20.0 APL U/mL    Anticardiolipin IgG      <20.0 GPL U/mL    Anticardiolipin IgM      <20.0 MPL U/mL    ANCA IFA Titer      <1:20     ANCA IFA Pattern      None Detected     Anti-SM      <1.0 AI    MARCELINO COMPLEMENT    URIC ACID      2.5 - 6.8 mg/dL    Creatine Kinase      24 - 195 U/L    C1Q Complement      10.3 - 20.5 mg/dL    C3 Complement      87 - 200 mg/dL    Sed Rate      0 - 30 mm/h    Thyroid Peroxidase (TPO) Antibody      <=60 IU/mL    Anti-DNA (DS)      <5.0 IU/mL    C-Reactive Protein      0.00 - 2.00 mg/dL    HIV 1/2 Antigen/Antibody Screen with Reflex to Confirmation      Nonreactive     Vit D, 1,25-Dihydroxy      19.9 - 79.3 pg/mL    Hepatitis C AB      Nonreactive     Hep Be Antigen      Negative     B. burgdorferi VlsE1/pepC10 Abs, CARL      <=0.90 IV    Angio Converting Enzyme      16 - 85 U/L    Anti-Thyroglobulin AB      0.0 - 4.0 IU/mL    Vitamin D, 25-Hydroxy, Total      31 - 100 ng/mL    ASO      0 - 250 IU/mL    Total Protein, Urine      5 - 25 mg/dL    Extra Tube Hold for add-ons. (P)   Extra Tube Hold for add-ons. (P)   Scan Result        MRN: [id number]  Patient Name: MARCIA WRIGHT     STUDY:  US LIVER; 12/29/2017 9:02 am     INDICATION:  Signs/Symptoms: Elevated liver enzymes.     COMPARISON:  None.     ACCESSION NUMBER(S):  [id number]     ORDERING CLINICIAN:  CRISTOFER HUGHES     TECHNIQUE:  Multiple images of the right upper quadrant were obtained. This  examination was interpreted at [facility].     FINDINGS:  LIVER:  Liver measures 16 cm in craniocaudal dimension. The echotexture of  liver is  slightly increased, likely due to fatty changes. There is no  mass or focal lesion.     GALLBLADDER:  The gallbladder is nondistended demonstrates no evidence of  gallstones, wall thickening or surrounding fluid. Sonographic  Marin's sign is negative.     BILIARY TREE:  No intra or extrahepatic biliary dilatation is identified. The common  bile duct measures 0.3 cm     PANCREAS:  The pancreas is poorly visualized due to overlying bowel gas.     RIGHT KIDNEY:  The right kidney is normal in size, measuring 10 cm in craniocaudal  dimension. The renal cortical echogenicity and thickness are within  normal limits. No hydronephrosis or renal calculi are seen.     IMPRESSION:  Hepatic steatosis and no biliary dilation.        I personally reviewed the images/study and I agree with the findings  as stated. This study was interpreted at [place], [place].    TRANSTHORACIC ECHOCARDIOGRAM REPORT        Patient Name:      MARCIA Hernández Physician:    [id number] James Sena DO  Study Date:        11/2/2023           Ordering Provider:    [id number] JAMES SENA  MRN/PID:           [id number]            Fellow:  Accession#:        [id number]        Nurse:  Date of Birth/Age: 1965 / 58      Sonographer:          James Leiva RDCS                     years  Gender:            F                   Additional Staff:  Height:            174.00 cm           Admit Date:  Weight:            100.00 kg           Admission Status:     Outpatient  BSA:               2.14 m2             Department Location:  [facility]  Blood Pressure: 138 /78 mmHg     Study Type:    TRANSTHORACIC ECHO (TTE) COMPLETE  Diagnosis/ICD: Abnormal electrocardiogram [ECG] [EKG]-R94.31  Indication:    Abnormal EKG  CPT Codes:     Echo Complete w Full Doppler-[id number]     Patient History:  Pertinent History: HTN, LE Edema, Hyperlipidemia and Murmur.      Study Detail: The following Echo studies were performed: 2D, M-Mode, Doppler and                color flow.        PHYSICIAN INTERPRETATION:  Left Ventricle: Left ventricular systolic function is normal, with an estimated ejection fraction of 55-60%. There are no regional wall motion abnormalities. The left ventricular cavity size is normal. Spectral Doppler shows a normal pattern of left ventricular diastolic filling.  Left Atrium: The left atrium is normal in size.  Right Ventricle: The right ventricle is normal in size. There is normal right ventricular global systolic function.  Right Atrium: The right atrium is normal in size.  Aortic Valve: The aortic valve is trileaflet. There is no evidence of aortic valve regurgitation. The peak instantaneous gradient of the aortic valve is 8.0 mmHg. The mean gradient of the aortic valve is 4.0 mmHg.  Mitral Valve: The mitral valve is normal in structure. There is no evidence of mitral valve regurgitation.  Tricuspid Valve: The tricuspid valve is structurally normal. There is mild tricuspid regurgitation.  Pulmonic Valve: The pulmonic valve is structurally normal. There is trace pulmonic valve regurgitation.  Pericardium: There is no pericardial effusion noted.  Aorta: The aortic root is abnormal. There is mild dilatation of the ascending aorta.        CONCLUSIONS:   1. Left ventricular systolic function is normal with a 55-60% estimated ejection fraction.   2. Mild tricuspid regurgitation is visualized.   3. Mild dilation ascending aorta, diameter 3.9 cm.     QUANTITATIVE DATA SUMMARY:  2D MEASUREMENTS:                           Normal Ranges:  IVSd:          0.91 cm   (0.6-1.1cm)  LVPWd:         0.96 cm   (0.6-1.1cm)  LVIDd:         4.62 cm   (3.9-5.9cm)  LVIDs:         3.34 cm  LV Mass Index: 68.1 g/m2  LV % FS        27.7 %     LA VOLUME:                                Normal Ranges:  LA Vol A4C:        68.0 ml    (22+/-6mL/m2)  LA Vol A2C:        73.5 ml  LA Vol BP:          74.8 ml  LA Vol Index A4C:  31.8ml/m2  LA Vol Index A2C:  34.3 ml/m2  LA Vol Index BP:   34.9 ml/m2  LA Area A4C:       20.0 cm2  LA Area A2C:       22.0 cm2  LA Major Axis A4C: 5.0 cm  LA Major Axis A2C: 5.6 cm  LA Volume Index:   34.0 ml/m2  LA Vol A4C:        63.0 ml  LA Vol A2C:        70.0 ml     RA VOLUME BY A/L METHOD:                        Normal Ranges:  RA Area A4C: 15.0 cm2     M-MODE MEASUREMENTS:                   Normal Ranges:  Ao Root: 3.40 cm (2.0-3.7cm)  LAs:     4.10 cm (2.7-4.0cm)     AORTA MEASUREMENTS:                     Normal Ranges:  Asc Ao, d: 3.90 cm (2.1-3.4cm)     LV SYSTOLIC FUNCTION BY 2D PLANIMETRY (MOD):                      Normal Ranges:  EF-A4C View: 53.3 % (>=55%)  EF-A2C View: 57.5 %  EF-Biplane:  55.5 %     LV DIASTOLIC FUNCTION:                         Normal Ranges:  MV Peak E:    0.72 m/s (0.7-1.2 m/s)  MV Peak A:    0.95 m/s (0.42-0.7 m/s)  E/A Ratio:    0.76     (1.0-2.2)  MV lateral e' 0.09 m/s  MV medial e'  0.05 m/s     MITRAL VALVE:                  Normal Ranges:  MV DT: 134 msec (150-240msec)     AORTIC VALVE:                                    Normal Ranges:  AoV Vmax:                1.41 m/s (<=1.7m/s)  AoV Peak P.0 mmHg (<20mmHg)  AoV Mean P.0 mmHg (1.7-11.5mmHg)  LVOT Max Niles:            1.00 m/s (<=1.1m/s)  AoV VTI:                 32.30 cm (18-25cm)  LVOT VTI:                21.70 cm  LVOT Diameter:           2.10 cm  (1.8-2.4cm)  AoV Area, VTI:           2.33 cm2 (2.5-5.5cm2)  AoV Area,Vmax:           2.45 cm2 (2.5-4.5cm2)  AoV Dimensionless Index: 0.67        RIGHT VENTRICLE:  RV Basal 3.97 cm  RV Mid   2.99 cm  RV Major 6.8 cm  TAPSE:   31.7 mm  RV s'    0.11 m/s     TRICUSPID VALVE/RVSP:                    Normal Ranges:  IVC Diam: 1.43 cm     PULMONIC VALVE:                       Normal Ranges:  PV Max Niles: 1.0 m/s  (0.6-0.9m/s)  PV Max PG:  3.8 mmHg        [id number] [facility]  Electronically signed on 2023 at  11:37:09 AM           PFT Interpretation     Quality of Data:  The patient's efforts are acceptable and reproducible.     Test Performed:  Complete pulmonary function tests, including spirometry with and without bronchodilator, measurement of lung volume, and diffusing capacity     Spirometry Interpretation:  Spirometry is within normal limits.     Flow Volume Loops:  Flow-Volume loops are normal.     Lung Volumes:  Plethysmographic lung volumes are within normal limits.     Diffusing Capacity:  Diffusing capacity of the lung is normal.     Conclusions:  Pulmonary function testing is within normal limits.        Assessment/Plan  1. Infection    2. Undifferentiated connective tissue disease (CMS/HCC)    3. Positive sm/RNP antibody    4. Polyarthritis with negative rheumatoid factor (CMS/HCC)    5. Vitamin D deficiency       Orders Placed This Encounter   Procedures    REYNALDO + NURYS Panel    Anti-DNA Antibody, Double-Stranded    C3 Complement    C4 Complement    CBC and Auto Differential    C-Reactive Protein    Creatine Kinase    Sedimentation Rate    Urinalysis with Reflex Microscopic and Culture    T-Spot TB    [facility]; [facility]; [id number] - Miscellaneous Test    Vitamin D 25-Hydroxy,Total (for eval of Vitamin D levels)    Vitamin D 1,25 Dihydroxy (for eval of hypercalcemia)    Hepatitis B Surface Antigen        Workup reviewed and d/w pt.  At this point no clear indication/need for immunosuppressivi or DMARD.  Baseline workup including complete PFTs wnl; Echo shows mild TR and mildly dilated ascending aorta- monitor.  Tx current infection. Hydrate well and have yogurt and/or probiotic daily.  Monitor yearly or earlier if concerning new sx for inflammation/autoimmune disease.      Advised of possible side effects and importance of monitoring.   All questions answered.  Patient to follow up with primary care provider regarding all other medical issues not addressed today.     Camila Hannah MD      Patient Care  Team:  JEAN PAUL Abraham as PCP - [facility] PCP  JEAN PAUL Abraham as Primary Care Provider  Camila Hannah MD as Consulting Physician (Rheumatology)

## 2023-12-13 LAB
HOLD SPECIMEN: NORMAL
HOLD SPECIMEN: NORMAL

## 2024-04-16 DIAGNOSIS — E03.9 HYPOTHYROIDISM, UNSPECIFIED TYPE: ICD-10-CM

## 2024-04-16 RX ORDER — LEVOTHYROXINE SODIUM 125 UG/1
125 TABLET ORAL DAILY
Qty: 90 TABLET | Refills: 1 | Status: SHIPPED | OUTPATIENT
Start: 2024-04-16

## 2024-07-10 ENCOUNTER — TELEPHONE (OUTPATIENT)
Dept: PRIMARY CARE | Facility: CLINIC | Age: 59
End: 2024-07-10
Payer: COMMERCIAL

## 2024-07-10 DIAGNOSIS — E03.9 HYPOTHYROIDISM, UNSPECIFIED TYPE: ICD-10-CM

## 2024-07-10 DIAGNOSIS — E78.5 HYPERLIPIDEMIA, UNSPECIFIED HYPERLIPIDEMIA TYPE: ICD-10-CM

## 2024-07-10 DIAGNOSIS — Z00.00 ANNUAL PHYSICAL EXAM: Primary | ICD-10-CM

## 2024-08-22 ENCOUNTER — LAB (OUTPATIENT)
Dept: LAB | Facility: LAB | Age: 59
End: 2024-08-22
Payer: COMMERCIAL

## 2024-08-22 DIAGNOSIS — M35.9 UNDIFFERENTIATED CONNECTIVE TISSUE DISEASE (MULTI): ICD-10-CM

## 2024-08-22 DIAGNOSIS — E78.5 HYPERLIPIDEMIA, UNSPECIFIED HYPERLIPIDEMIA TYPE: ICD-10-CM

## 2024-08-22 DIAGNOSIS — E03.9 HYPOTHYROIDISM, UNSPECIFIED TYPE: ICD-10-CM

## 2024-08-22 DIAGNOSIS — M06.09 POLYARTHRITIS WITH NEGATIVE RHEUMATOID FACTOR (MULTI): ICD-10-CM

## 2024-08-22 DIAGNOSIS — Z00.00 ANNUAL PHYSICAL EXAM: ICD-10-CM

## 2024-08-22 DIAGNOSIS — E55.9 VITAMIN D DEFICIENCY: ICD-10-CM

## 2024-08-22 LAB
25(OH)D3 SERPL-MCNC: 50 NG/ML (ref 31–100)
ALBUMIN SERPL-MCNC: 4.5 G/DL (ref 3.5–5)
ALP BLD-CCNC: 129 U/L (ref 35–125)
ALT SERPL-CCNC: 37 U/L (ref 5–40)
ANION GAP SERPL CALC-SCNC: 10 MMOL/L
APPEARANCE UR: CLEAR
AST SERPL-CCNC: 25 U/L (ref 5–40)
BASOPHILS # BLD AUTO: 0.05 X10*3/UL (ref 0–0.1)
BASOPHILS NFR BLD AUTO: 1 %
BILIRUB SERPL-MCNC: 0.6 MG/DL (ref 0.1–1.2)
BILIRUB UR STRIP.AUTO-MCNC: NEGATIVE MG/DL
BUN SERPL-MCNC: 22 MG/DL (ref 8–25)
C3 SERPL-MCNC: 177 MG/DL (ref 87–200)
C4 SERPL-MCNC: 46 MG/DL (ref 10–50)
CALCIUM SERPL-MCNC: 9.7 MG/DL (ref 8.5–10.4)
CENTROMERE B AB SER-ACNC: <0.2 AI
CHLORIDE SERPL-SCNC: 106 MMOL/L (ref 97–107)
CHOLEST SERPL-MCNC: 249 MG/DL (ref 133–200)
CHOLEST/HDLC SERPL: 3.2 {RATIO}
CHROMATIN AB SERPL-ACNC: <0.2 AI
CK SERPL-CCNC: 99 U/L (ref 24–195)
CO2 SERPL-SCNC: 25 MMOL/L (ref 24–31)
COLOR UR: NORMAL
CREAT SERPL-MCNC: 0.9 MG/DL (ref 0.4–1.6)
CRP SERPL-MCNC: 0.4 MG/DL (ref 0–2)
DSDNA AB SER-ACNC: <1 IU/ML
EGFRCR SERPLBLD CKD-EPI 2021: 74 ML/MIN/1.73M*2
ENA JO1 AB SER QL IA: <0.2 AI
ENA RNP AB SER IA-ACNC: 0.5 AI
ENA SCL70 AB SER QL IA: <0.2 AI
ENA SM AB SER IA-ACNC: <0.2 AI
ENA SM+RNP AB SER QL IA: <0.2 AI
ENA SS-A AB SER IA-ACNC: <0.2 AI
ENA SS-B AB SER IA-ACNC: <0.2 AI
EOSINOPHIL # BLD AUTO: 0.3 X10*3/UL (ref 0–0.7)
EOSINOPHIL NFR BLD AUTO: 5.8 %
ERYTHROCYTE [DISTWIDTH] IN BLOOD BY AUTOMATED COUNT: 12.7 % (ref 11.5–14.5)
ERYTHROCYTE [SEDIMENTATION RATE] IN BLOOD BY WESTERGREN METHOD: 19 MM/H (ref 0–30)
GLUCOSE SERPL-MCNC: 96 MG/DL (ref 65–99)
GLUCOSE UR STRIP.AUTO-MCNC: NORMAL MG/DL
HBV SURFACE AG SERPL QL IA: NONREACTIVE
HCT VFR BLD AUTO: 38.3 % (ref 36–46)
HDLC SERPL-MCNC: 78 MG/DL
HGB BLD-MCNC: 13 G/DL (ref 12–16)
IMM GRANULOCYTES # BLD AUTO: 0.01 X10*3/UL (ref 0–0.7)
IMM GRANULOCYTES NFR BLD AUTO: 0.2 % (ref 0–0.9)
KETONES UR STRIP.AUTO-MCNC: NEGATIVE MG/DL
LDLC SERPL CALC-MCNC: 153 MG/DL (ref 65–130)
LEUKOCYTE ESTERASE UR QL STRIP.AUTO: NEGATIVE
LYMPHOCYTES # BLD AUTO: 1.5 X10*3/UL (ref 1.2–4.8)
LYMPHOCYTES NFR BLD AUTO: 29.2 %
MCH RBC QN AUTO: 30.4 PG (ref 26–34)
MCHC RBC AUTO-ENTMCNC: 33.9 G/DL (ref 32–36)
MCV RBC AUTO: 90 FL (ref 80–100)
MONOCYTES # BLD AUTO: 0.4 X10*3/UL (ref 0.1–1)
MONOCYTES NFR BLD AUTO: 7.8 %
NEUTROPHILS # BLD AUTO: 2.87 X10*3/UL (ref 1.2–7.7)
NEUTROPHILS NFR BLD AUTO: 56 %
NITRITE UR QL STRIP.AUTO: NEGATIVE
NRBC BLD-RTO: 0 /100 WBCS (ref 0–0)
PH UR STRIP.AUTO: 6.5 [PH]
PLATELET # BLD AUTO: 242 X10*3/UL (ref 150–450)
POTASSIUM SERPL-SCNC: 4.7 MMOL/L (ref 3.4–5.1)
PROT SERPL-MCNC: 6.6 G/DL (ref 5.9–7.9)
PROT UR STRIP.AUTO-MCNC: NEGATIVE MG/DL
RBC # BLD AUTO: 4.28 X10*6/UL (ref 4–5.2)
RBC # UR STRIP.AUTO: NEGATIVE /UL
RIBOSOMAL P AB SER-ACNC: <0.2 AI
SODIUM SERPL-SCNC: 141 MMOL/L (ref 133–145)
SP GR UR STRIP.AUTO: 1.02
TRIGL SERPL-MCNC: 91 MG/DL (ref 40–150)
TSH SERPL DL<=0.05 MIU/L-ACNC: 1.29 MIU/L (ref 0.27–4.2)
UROBILINOGEN UR STRIP.AUTO-MCNC: NORMAL MG/DL
WBC # BLD AUTO: 5.1 X10*3/UL (ref 4.4–11.3)

## 2024-08-22 PROCEDURE — 85025 COMPLETE CBC W/AUTO DIFF WBC: CPT

## 2024-08-22 PROCEDURE — 82306 VITAMIN D 25 HYDROXY: CPT

## 2024-08-22 PROCEDURE — 87340 HEPATITIS B SURFACE AG IA: CPT

## 2024-08-22 PROCEDURE — 85652 RBC SED RATE AUTOMATED: CPT

## 2024-08-22 PROCEDURE — 86235 NUCLEAR ANTIGEN ANTIBODY: CPT

## 2024-08-22 PROCEDURE — 86038 ANTINUCLEAR ANTIBODIES: CPT

## 2024-08-22 PROCEDURE — 36415 COLL VENOUS BLD VENIPUNCTURE: CPT

## 2024-08-22 PROCEDURE — 86160 COMPLEMENT ANTIGEN: CPT

## 2024-08-22 PROCEDURE — 86481 TB AG RESPONSE T-CELL SUSP: CPT

## 2024-08-22 PROCEDURE — 81003 URINALYSIS AUTO W/O SCOPE: CPT

## 2024-08-22 PROCEDURE — 82652 VIT D 1 25-DIHYDROXY: CPT

## 2024-08-22 PROCEDURE — 80053 COMPREHEN METABOLIC PANEL: CPT

## 2024-08-22 PROCEDURE — 81490 AUTOIMMUNE RA ALYS 12 BMRK: CPT

## 2024-08-22 PROCEDURE — 86225 DNA ANTIBODY NATIVE: CPT

## 2024-08-22 PROCEDURE — 84443 ASSAY THYROID STIM HORMONE: CPT

## 2024-08-22 PROCEDURE — 80061 LIPID PANEL: CPT

## 2024-08-22 PROCEDURE — 86140 C-REACTIVE PROTEIN: CPT

## 2024-08-22 PROCEDURE — 82550 ASSAY OF CK (CPK): CPT

## 2024-08-22 ASSESSMENT — PROMIS GLOBAL HEALTH SCALE
RATE_SOCIAL_SATISFACTION: VERY GOOD
RATE_QUALITY_OF_LIFE: VERY GOOD
RATE_AVERAGE_PAIN: 2
RATE_PHYSICAL_HEALTH: VERY GOOD
CARRYOUT_SOCIAL_ACTIVITIES: EXCELLENT
EMOTIONAL_PROBLEMS: RARELY
CARRYOUT_PHYSICAL_ACTIVITIES: COMPLETELY
RATE_GENERAL_HEALTH: VERY GOOD
RATE_MENTAL_HEALTH: VERY GOOD
RATE_AVERAGE_FATIGUE: MILD

## 2024-08-24 LAB
1,25(OH)2D SERPL-MCNC: 77.4 PG/ML (ref 19.9–79.3)
NIL(NEG) CONTROL SPOT COUNT: NORMAL
PANEL A SPOT COUNT: 0
PANEL B SPOT COUNT: 1
POS CONTROL SPOT COUNT: NORMAL
T-SPOT. TB INTERPRETATION: NEGATIVE

## 2024-08-26 LAB — ANA SER QL HEP2 SUBST: NEGATIVE

## 2024-08-28 ENCOUNTER — TELEPHONE (OUTPATIENT)
Dept: PRIMARY CARE | Facility: CLINIC | Age: 59
End: 2024-08-28

## 2024-08-28 ENCOUNTER — APPOINTMENT (OUTPATIENT)
Dept: PRIMARY CARE | Facility: CLINIC | Age: 59
End: 2024-08-28
Payer: COMMERCIAL

## 2024-08-28 VITALS
BODY MASS INDEX: 32.58 KG/M2 | DIASTOLIC BLOOD PRESSURE: 82 MMHG | TEMPERATURE: 97.9 F | SYSTOLIC BLOOD PRESSURE: 120 MMHG | WEIGHT: 220 LBS | OXYGEN SATURATION: 97 % | HEART RATE: 86 BPM | HEIGHT: 69 IN

## 2024-08-28 DIAGNOSIS — Z00.00 ANNUAL PHYSICAL EXAM: ICD-10-CM

## 2024-08-28 DIAGNOSIS — E03.9 HYPOTHYROIDISM, UNSPECIFIED TYPE: ICD-10-CM

## 2024-08-28 DIAGNOSIS — Z00.00 ANNUAL PHYSICAL EXAM: Primary | ICD-10-CM

## 2024-08-28 DIAGNOSIS — E78.5 HYPERLIPIDEMIA, UNSPECIFIED HYPERLIPIDEMIA TYPE: ICD-10-CM

## 2024-08-28 DIAGNOSIS — L20.9 ATOPIC DERMATITIS, UNSPECIFIED TYPE: ICD-10-CM

## 2024-08-28 DIAGNOSIS — M85.80 OSTEOPENIA, UNSPECIFIED LOCATION: ICD-10-CM

## 2024-08-28 LAB — SCAN RESULT: NORMAL

## 2024-08-28 PROCEDURE — 99213 OFFICE O/P EST LOW 20 MIN: CPT | Performed by: STUDENT IN AN ORGANIZED HEALTH CARE EDUCATION/TRAINING PROGRAM

## 2024-08-28 PROCEDURE — 99396 PREV VISIT EST AGE 40-64: CPT | Performed by: STUDENT IN AN ORGANIZED HEALTH CARE EDUCATION/TRAINING PROGRAM

## 2024-08-28 PROCEDURE — 3008F BODY MASS INDEX DOCD: CPT | Performed by: STUDENT IN AN ORGANIZED HEALTH CARE EDUCATION/TRAINING PROGRAM

## 2024-08-28 RX ORDER — ERGOCALCIFEROL 1.25 MG/1
1.25 CAPSULE ORAL
Qty: 12 CAPSULE | Refills: 3 | Status: CANCELLED | OUTPATIENT
Start: 2024-09-01 | End: 2025-09-01

## 2024-08-28 ASSESSMENT — COLUMBIA-SUICIDE SEVERITY RATING SCALE - C-SSRS
6. HAVE YOU EVER DONE ANYTHING, STARTED TO DO ANYTHING, OR PREPARED TO DO ANYTHING TO END YOUR LIFE?: NO
1. IN THE PAST MONTH, HAVE YOU WISHED YOU WERE DEAD OR WISHED YOU COULD GO TO SLEEP AND NOT WAKE UP?: NO
2. HAVE YOU ACTUALLY HAD ANY THOUGHTS OF KILLING YOURSELF?: NO

## 2024-08-28 ASSESSMENT — ENCOUNTER SYMPTOMS
DEPRESSION: 0
OCCASIONAL FEELINGS OF UNSTEADINESS: 0
LOSS OF SENSATION IN FEET: 0

## 2024-08-28 ASSESSMENT — PAIN SCALES - GENERAL: PAINLEVEL: 0-NO PAIN

## 2024-08-28 NOTE — PROGRESS NOTES
Subjective   Patient ID: Beverley Waggoner is a 59 y.o. female who presents for Annual Exam.    INTERVAL HX/CURRENT CONCERNS:  Rt knee pain anterior aspect. Over knee cap and distal. No injury. Not taking anything, not bothersome enough to need medication. Present few mos. Wanted to mention in case gets worse.     Circular rash posterior Rt calf. Little itchy. Tried antifungal as thought was ring worm. No improvement.     CHRONIC CONDITIONS:  Hypothyroidism - Levothyroxine 125mcg, TSH 1.29  Osteopenia - on Vit D supplement  GLORY - sleep study done previously, decided not to get CPAP due to cost     Works an office job, 3 day WFH, 2 days at office. .  3 children, 26 yo twins, and 28 yo  Walking 5 days a week and goes to gym  Has a long term partner  Working with dietician - doing more weighted work outs, diet changes    SPECIALISTS  Dr. Truong vein specialist for venous insufficiency. Right ankle insufficiency for which she wears compression stocking for.  Sees [facility] for acne periodically.  Rheumatology - Dr. Jaylon Hannah for possible vasculitis RLE    Health Maintenance  Immunizations:     Tdap - 2018    Pneumococcal -     Shingles -x2 shingrix    COVID - none, no plans for updated vaccine    Influenza - does not get    Colonoscopy:    - Next colonoscopy due -   Lung cancer screening: never smoker    WOMEN'S Health  Under  Gyn Radha Berry  Postmenopausal: yes. Age 51  LMP: age 51  HRT use: no  H/o Gyn Surgery : no  Last Pap:   normal, HPV neg  History of abnormal pap: many years ago  Sexually active: same partner x 5 yrs   OB History          3    Para   1    Term   1            AB   2    Living   1         SAB   2    IAB        Ectopic        Multiple        Live Births   1               Last mammogram:  2024  History of abnormal mammogram: recalled in past, no biopsy  Bone Density:  osteopenia  -Calcium intake adequate. Vitamin D intake at 800-1000 IU/day.         The  10-year ASCVD risk score (Jazmin LILLY, et al., 2019) is: 2.2%    Values used to calculate the score:      Age: 59 years      Sex: Female      Is Non- : No      Diabetic: No      Tobacco smoker: No      Systolic Blood Pressure: 120 mmHg      Is BP treated: No      HDL Cholesterol: 78 mg/dL      Total Cholesterol: 249 mg/dL      Current Outpatient Medications   Medication Instructions    biotin 1 mg tablet 1 tablet, oral, Daily    CALCIUM CARBONATE-VITAMIN D3 ORAL 1 tablet, oral, Daily    ergocalciferol (VITAMIN D-2) 1.25 mg, oral, Once Weekly    levothyroxine (SYNTHROID, LEVOXYL) 125 mcg, oral, Daily    MAGNESIUM OXIDE ORAL oral, Daily    multivit-min/ferrous fumarate (MULTI VITAMIN ORAL) oral, Daily    pyridoxine HCl, vitamin B6, (VITAMIN B-6 ORAL) Daily     Allergies   Allergen Reactions    Adhesive Tape-Silicones Itching     Redness at site of strips from echo       Immunization History   Administered Date(s) Administered    Tdap vaccine, age 7 year and older (BOOSTRIX, ADACEL) 05/21/2018    Zoster vaccine, recombinant, adult (SHINGRIX) 06/04/2021, 09/29/2021     Past Surgical History:   Procedure Laterality Date    FOOT TENDON TRANSFER Right     TOE SURGERY       Family History   Problem Relation Name Age of Onset    Cancer Mother Mom     Other (non hogkins lymphoma) Mother Mom     Heart disease Father Dad     Leukemia Sister      Other (gluten allergy) Sister      Dementia Brother      No Known Problems Daughter      No Known Problems Son      No Known Problems Son       Social History     Tobacco Use    Smoking status: Never     Passive exposure: Never    Smokeless tobacco: Never    Tobacco comments:     Dad smoked   Vaping Use    Vaping status: Never Used   Substance Use Topics    Alcohol use: Never    Drug use: Never       Review of Systems   Constitutional:  Negative for chills and fever.   HENT:  Negative for trouble swallowing.    Eyes:  Negative for visual disturbance.   Respiratory:  " Negative for cough, chest tightness, shortness of breath and wheezing.    Cardiovascular:  Negative for chest pain and palpitations.   Gastrointestinal:  Negative for abdominal pain, blood in stool, constipation and diarrhea.   Genitourinary:  Negative for difficulty urinating, vaginal bleeding, vaginal discharge and vaginal pain.   Neurological:  Negative for dizziness, weakness, light-headedness and headaches.       Objective   Visit Vitals  /82   Pulse 86   Temp 36.6 °C (97.9 °F)   Ht 1.755 m (5' 9.09\")   Wt 99.8 kg (220 lb)   SpO2 97%   BMI 32.40 kg/m²   Smoking Status Never   BSA 2.21 m²       Physical Exam  Constitutional:       Appearance: Normal appearance.   HENT:      Head: Normocephalic and atraumatic.      Right Ear: Tympanic membrane, ear canal and external ear normal.      Left Ear: Tympanic membrane, ear canal and external ear normal.      Mouth/Throat:      Mouth: Mucous membranes are moist.      Pharynx: Oropharynx is clear.   Eyes:      Extraocular Movements: Extraocular movements intact.      Conjunctiva/sclera: Conjunctivae normal.      Pupils: Pupils are equal, round, and reactive to light.   Cardiovascular:      Rate and Rhythm: Normal rate and regular rhythm.      Pulses: Normal pulses.      Heart sounds: Normal heart sounds. No murmur heard.  Pulmonary:      Effort: Pulmonary effort is normal.      Breath sounds: Normal breath sounds. No wheezing, rhonchi or rales.   Abdominal:      General: Abdomen is flat.      Palpations: Abdomen is soft.      Tenderness: There is no abdominal tenderness.   Musculoskeletal:         General: Normal range of motion.      Cervical back: Neck supple.      Right knee: No swelling, effusion or erythema. Normal range of motion. No tenderness. Normal alignment.   Skin:     General: Skin is warm and dry.      Findings: Rash: Rt posterior calf 1 cm circular erythematous patch.   Neurological:      Mental Status: She is alert.   Psychiatric:         Mood and " Affect: Mood normal.         Behavior: Behavior normal.       Assessment/Plan   1. Annual physical exam  Well adult exam.  1. Age appropriate preventative measures reviewed.   2. Encouraged healthy diet and exercise.  3. Immunizations- Reviewed and discussed  4. Labs- Reviewed and discussed with patient  5. Medications- Reviewed      2. Hypothyroidism, unspecified type  Euthyroid on current dose of levothyroxine. Continue to take on empty stomach. Recheck TSH in 1 year or sooner if develops symptoms    3. Hyperlipidemia, unspecified hyperlipidemia type  Cholesterol elevated, Low ASCVD risk score calculation. no medication indicated at this time, recommend routine exercise and healthy diet    4. Atopic dermatitis, unspecified type  Looks like nummular eczematous patch. Try topical steroid for possible   - triamcinolone (Kenalog) 0.1 % ointment; Apply topically 2 times a day as needed for irritation or rash.  Dispense: 15 g; Refill: 0    5. Osteopenia, unspecified location

## 2024-09-04 RX ORDER — TRIAMCINOLONE ACETONIDE 1 MG/G
OINTMENT TOPICAL 2 TIMES DAILY PRN
Qty: 15 G | Refills: 0 | Status: SHIPPED | OUTPATIENT
Start: 2024-09-04 | End: 2025-01-02

## 2024-09-04 ASSESSMENT — ENCOUNTER SYMPTOMS
CHEST TIGHTNESS: 0
LIGHT-HEADEDNESS: 0
DIZZINESS: 0
DIARRHEA: 0
FEVER: 0
SHORTNESS OF BREATH: 0
PALPITATIONS: 0
TROUBLE SWALLOWING: 0
WEAKNESS: 0
BLOOD IN STOOL: 0
WHEEZING: 0
COUGH: 0
ABDOMINAL PAIN: 0
CHILLS: 0
CONSTIPATION: 0
DIFFICULTY URINATING: 0
HEADACHES: 0

## 2024-10-10 DIAGNOSIS — E03.9 HYPOTHYROIDISM, UNSPECIFIED TYPE: ICD-10-CM

## 2024-10-10 RX ORDER — LEVOTHYROXINE SODIUM 125 UG/1
125 TABLET ORAL DAILY
Qty: 90 TABLET | Refills: 2 | Status: SHIPPED | OUTPATIENT
Start: 2024-10-10

## 2024-11-25 ENCOUNTER — TELEPHONE (OUTPATIENT)
Dept: RHEUMATOLOGY | Facility: CLINIC | Age: 59
End: 2024-11-25

## 2024-11-25 RX ORDER — CLINDAMYCIN PHOSPHATE 10 UG/ML
LOTION TOPICAL
COMMUNITY
Start: 2024-02-21

## 2024-12-05 ENCOUNTER — APPOINTMENT (OUTPATIENT)
Dept: RHEUMATOLOGY | Facility: CLINIC | Age: 59
End: 2024-12-05
Payer: COMMERCIAL

## 2025-01-30 ENCOUNTER — OFFICE VISIT (OUTPATIENT)
Dept: RHEUMATOLOGY | Facility: CLINIC | Age: 60
End: 2025-01-30
Payer: COMMERCIAL

## 2025-01-30 VITALS
SYSTOLIC BLOOD PRESSURE: 128 MMHG | BODY MASS INDEX: 30.93 KG/M2 | DIASTOLIC BLOOD PRESSURE: 75 MMHG | WEIGHT: 210 LBS | HEART RATE: 79 BPM | OXYGEN SATURATION: 95 %

## 2025-01-30 DIAGNOSIS — E55.9 VITAMIN D DEFICIENCY: ICD-10-CM

## 2025-01-30 DIAGNOSIS — M35.9 UNDIFFERENTIATED CONNECTIVE TISSUE DISEASE (MULTI): Primary | ICD-10-CM

## 2025-01-30 DIAGNOSIS — M06.09 POLYARTHRITIS WITH NEGATIVE RHEUMATOID FACTOR (MULTI): ICD-10-CM

## 2025-01-30 DIAGNOSIS — R76.8 POSITIVE SM/RNP ANTIBODY: ICD-10-CM

## 2025-01-30 DIAGNOSIS — Z78.0 POSTMENOPAUSAL: ICD-10-CM

## 2025-01-30 PROCEDURE — 99213 OFFICE O/P EST LOW 20 MIN: CPT | Performed by: INTERNAL MEDICINE

## 2025-01-30 PROCEDURE — 1036F TOBACCO NON-USER: CPT | Performed by: INTERNAL MEDICINE

## 2025-01-30 ASSESSMENT — ROUTINE ASSESSMENT OF PATIENT INDEX DATA (RAPID3)
FN_SCORE: 0
PARTIPATE_RECREATIONAL_ACTIVITIES: WITHOUT ANY DIFFICULTY
IN_OUT_BED: WITHOUT ANY DIFFICULTY
DRESS_YOURSELF: WITHOUT ANY DIFFICULTY
IN_OUT_TRANSPORT: WITHOUT ANY DIFFICULTY
SUM OF QUESTIONS A TO J: 0
WALK_FLAT_GROUND: WITHOUT ANY DIFFICULTY
TURN_FAUCETS_OFF: WITHOUT ANY DIFFICULTY
ON A SCALE OF ONE TO TEN, CONSIDERING ALL THE WAYS IN WHICH ILLNESS AND HEALTH CONDITIONS MAY AFFECT YOU AT THIS TIME, PLEASE INDICATE BELOW HOW YOU ARE DOING:: 0
ON A SCALE OF ONE TO TEN, HOW MUCH PAIN HAVE YOU HAD BECAUSE OF YOUR CONDITION OVER THE PAST WEEK?: 0
ON A SCALE OF ONE TO TEN, CONSIDERING ALL THE WAYS IN WHICH ILLNESS AND HEALTH CONDITIONS MAY AFFECT YOU AT THIS TIME, PLEASE INDICATE BELOW HOW YOU ARE DOING:: 0
WALK_KILOMETERS: WITHOUT ANY DIFFICULTY
TOTAL RAPID3 SCORE: 0
LIFT_CUP_TO_MOUTH: WITHOUT ANY DIFFICULTY
ON A SCALE OF ONE TO TEN, HOW MUCH PAIN HAVE YOU HAD BECAUSE OF YOUR CONDITION OVER THE PAST WEEK?: 0
GOOD_NIGHTS_SLEEP: WITH SOME DIFFICULTY
PICK_CLOTHES_OFF_FLOOR: WITHOUT ANY DIFFICULTY
WEIGHTED_TOTAL_SCORE: 0
WASH_DRY_BODY: WITHOUT ANY DIFFICULTY
SEVERITY_SCORE: 0
SEVERITY_SCORE: NEAR REMISSION (NR)
FEELINGS_ANXIETY_NERVOUS: WITHOUT ANY DIFFICULTY
FEELINGS_DEPRESSION: WITHOUT ANY DIFFICULTY

## 2025-01-30 ASSESSMENT — ENCOUNTER SYMPTOMS: DEPRESSION: 0

## 2025-01-30 ASSESSMENT — PAIN SCALES - GENERAL: PAINLEVEL_OUTOF10: 0-NO PAIN

## 2025-01-30 NOTE — PROGRESS NOTES
[facility]/  Rheumatology  [address], [address]  [place] [address]  Phone: [phone]  Fax: [phone]    Rheumatology Progress Note 01/30/2025      Beverley Waggoner is a 59 y.o. female here for   Chief Complaint   Patient presents with    Follow-up       Last Visit: 12/7/23    Rheum Hx      Previous Tx    Health Maintenance  DXA 2022 T -1.8  Malignancy Hx-none  Immunization History   Administered Date(s) Administered    Tdap vaccine, age 7 year and older (BOOSTRIX, ADACEL) 05/21/2018    Zoster vaccine, recombinant, adult (SHINGRIX) 06/04/2021, 09/29/2021          Past Medical History:   Diagnosis Date    Disease of thyroid gland 2000    Hyperthyroidism     Vitamin D deficiency       Past Surgical History:   Procedure Laterality Date    FOOT TENDON TRANSFER Right     TOE SURGERY        Current Outpatient Medications   Medication Sig Dispense Refill    biotin 1 mg tablet Take 1 tablet (1 mg) by mouth once daily.      CALCIUM CARBONATE-VITAMIN D3 ORAL Take 1 tablet by mouth once daily.      clindamycin (Cleocin T) 1 % lotion APPLY EXTERNALLY TO THE AFFECTED AREA ON CHEST TWICE DAILY      levothyroxine (Synthroid, Levoxyl) 125 mcg tablet TAKE 1 TABLET BY MOUTH DAILY 90 tablet 2    MAGNESIUM OXIDE ORAL Take by mouth once daily.      multivit-min/ferrous fumarate (MULTI VITAMIN ORAL) Take by mouth once daily.      pyridoxine HCl, vitamin B6, (VITAMIN B-6 ORAL) once daily.       No current facility-administered medications for this visit.      Allergies   Allergen Reactions    Adhesive Tape-Silicones Itching     Redness at site of strips from echo      Visit Vitals  /75 (BP Location: Right arm, Patient Position: Sitting, BP Cuff Size: Adult)   Pulse 79   Wt 95.3 kg (210 lb)   LMP  (LMP Unknown)   SpO2 95%   BMI 30.93 kg/m²   OB Status Postmenopausal   Smoking Status Never   BSA 2.16 m²          Rapid 3  Function Score (FN): 0  Pain Score (PN) (0-10): 0  Patient Global  (PTGL) (0-10): 0  Rapid3 Score: 0  RAPID3 Weighted Score: 0     Workup  Component      Latest Ref Rng 8/22/2024   WBC      4.4 - 11.3 x10*3/uL 5.1    nRBC      0.0 - 0.0 /100 WBCs 0.0    RBC      4.00 - 5.20 x10*6/uL 4.28    HEMOGLOBIN      12.0 - 16.0 g/dL 13.0    HEMATOCRIT      36.0 - 46.0 % 38.3    MCV      80 - 100 fL 90    MCH      26.0 - 34.0 pg 30.4    MCHC      32.0 - 36.0 g/dL 33.9    RED CELL DISTRIBUTION WIDTH      11.5 - 14.5 % 12.7    Platelets      150 - 450 x10*3/uL 242    Neutrophils %      40.0 - 80.0 % 56.0    Immature Granulocytes %, Automated      0.0 - 0.9 % 0.2    Lymphocytes %      13.0 - 44.0 % 29.2    Monocytes %      2.0 - 10.0 % 7.8    Eosinophils %      0.0 - 6.0 % 5.8    Basophils %      0.0 - 2.0 % 1.0    Neutrophils Absolute      1.20 - 7.70 x10*3/uL 2.87    Immature Granulocytes Absolute, Automated      0.00 - 0.70 x10*3/uL 0.01    Lymphocytes Absolute      1.20 - 4.80 x10*3/uL 1.50    Monocytes Absolute      0.10 - 1.00 x10*3/uL 0.40    Eosinophils Absolute      0.00 - 0.70 x10*3/uL 0.30    Basophils Absolute      0.00 - 0.10 x10*3/uL 0.05    ANTI-SM TEST      <1.0 AI <0.2    Anti-RNP      <1.0 AI 0.5    Anti-SM/RNP      <1.0 AI <0.2    Anti-SSA      <1.0 AI <0.2    Anti-SSB      <1.0 AI <0.2    Anti-SCL-70      <1.0 AI <0.2    Anti-NEISHA-1 IgG      <1.0 AI <0.2    Anti-Chromatin      <1.0 AI <0.2    Anti-Centromere      <1.0 AI <0.2    ANTI-RIBOSOMAL P      <1.0 AI <0.2    Anti-DNA (DS)      <5.0 IU/mL <1.0    T-SPOT. TB Interpretation      Negative  Negative    Panel A Spot Count 0    Panel B Spot Count 1    NIL(NEG) Control Spot Count Passed    POS Control Spot Count Passed    C3 Complement      87 - 200 mg/dL 177    C4 Complement      10 - 50 mg/dL 46    C-Reactive Protein      0.00 - 2.00 mg/dL 0.40    Creatine Kinase      24 - 195 U/L 99    Sed Rate      0 - 30 mm/h 19    Scan Result Vectra 27 (low)   Vitamin D, 25-Hydroxy, Total      31 - 100 ng/mL 50    Vit D, 1,25-Dihydroxy       19.9 - 79.3 pg/mL 77.4    Hepatitis B Surface AG      Nonreactive  Nonreactive    REYNALDO      Negative  Negative            Assessment/Plan  1. Undifferentiated connective tissue disease (Multi)    2. Positive sm/RNP antibody    3. Polyarthritis with negative rheumatoid factor (Multi)    4. Vitamin D deficiency    5. Postmenopausal         Orders Placed This Encounter   Procedures    XR DEXA bone density axial skeleton w VFA    Albumin-Creatinine Ratio, Urine Random    Anti-DNA Antibody, Double-Stranded    C1Q Complement    C3 Complement    C4 Complement    CBC and Auto Differential    Comprehensive Metabolic Panel    C-Reactive Protein    Creatine Kinase    Creatinine, Urine Random    Sedimentation Rate    Urinalysis with Reflex Culture and Microscopic    Vitamin D 25-Hydroxy,Total (for eval of Vitamin D levels)    Parathyroid Hormone, Intact    Phosphorus    Magnesium      Previously,  Workup reviewed and d/w pt.  At this point no clear indication/need for immunosuppressivi or DMARD.  Baseline workup including complete PFTs wnl; Echo shows mild TR and mildly dilated ascending aorta- monitor.  Tx current infection. Hydrate well and have yogurt and/or probiotic daily.  Monitor yearly or earlier if concerning new sx for inflammation/autoimmune disease.    Doing well. Minimal joint pain. ROS neg  Not on any NSAIDs or glucocorticoids.  Advised of possible side effects and importance of monitoring.   All questions answered.  Patient to follow up with primary care provider regarding all other medical issues not addressed today.     Camila Hannah MD      Patient Care Team:  Lashanda Ramirez MD as PCP - General (Family Medicine)  CHUY Abraham-CNP as PCP - [facility] P[facility]  Camila Hannah MD as Consulting Physician (Rheumatology)

## 2025-02-12 ENCOUNTER — HOSPITAL ENCOUNTER (OUTPATIENT)
Dept: RADIOLOGY | Facility: HOSPITAL | Age: 60
Discharge: HOME | End: 2025-02-12
Payer: COMMERCIAL

## 2025-02-12 DIAGNOSIS — Z78.0 POSTMENOPAUSAL: ICD-10-CM

## 2025-02-12 PROCEDURE — 77085 DXA BONE DENSITY AXL VRT FX: CPT

## 2025-02-12 PROCEDURE — 77086 VRT FRACTURE ASSMT VIA DXA: CPT | Performed by: RADIOLOGY

## 2025-03-05 ENCOUNTER — OFFICE VISIT (OUTPATIENT)
Dept: PRIMARY CARE | Facility: CLINIC | Age: 60
End: 2025-03-05
Payer: COMMERCIAL

## 2025-03-05 VITALS
SYSTOLIC BLOOD PRESSURE: 130 MMHG | DIASTOLIC BLOOD PRESSURE: 76 MMHG | OXYGEN SATURATION: 98 % | HEIGHT: 69 IN | BODY MASS INDEX: 30.81 KG/M2 | HEART RATE: 76 BPM | WEIGHT: 208 LBS

## 2025-03-05 DIAGNOSIS — T14.8XXA ABRASION OF SKIN: Primary | ICD-10-CM

## 2025-03-05 PROBLEM — M79.9 DISORDER OF SOFT TISSUE: Status: RESOLVED | Noted: 2022-12-07 | Resolved: 2025-03-05

## 2025-03-05 PROBLEM — E66.9 OBESITY WITH BODY MASS INDEX 30 OR GREATER: Status: RESOLVED | Noted: 2025-03-05 | Resolved: 2025-03-05

## 2025-03-05 PROBLEM — E66.811 OBESITY, CLASS I, BMI 30-34.9: Status: RESOLVED | Noted: 2019-09-20 | Resolved: 2025-03-05

## 2025-03-05 PROCEDURE — 3008F BODY MASS INDEX DOCD: CPT

## 2025-03-05 PROCEDURE — 99212 OFFICE O/P EST SF 10 MIN: CPT

## 2025-03-05 PROCEDURE — 1036F TOBACCO NON-USER: CPT

## 2025-03-05 ASSESSMENT — PATIENT HEALTH QUESTIONNAIRE - PHQ9
1. LITTLE INTEREST OR PLEASURE IN DOING THINGS: NOT AT ALL
SUM OF ALL RESPONSES TO PHQ9 QUESTIONS 1 AND 2: 0
2. FEELING DOWN, DEPRESSED OR HOPELESS: NOT AT ALL

## 2025-03-05 ASSESSMENT — ENCOUNTER SYMPTOMS
FEVER: 0
WOUND: 1
COLOR CHANGE: 1
CHILLS: 0

## 2025-03-05 ASSESSMENT — PAIN SCALES - GENERAL: PAINLEVEL_OUTOF10: 0-NO PAIN

## 2025-03-05 ASSESSMENT — COLUMBIA-SUICIDE SEVERITY RATING SCALE - C-SSRS: 1. IN THE PAST MONTH, HAVE YOU WISHED YOU WERE DEAD OR WISHED YOU COULD GO TO SLEEP AND NOT WAKE UP?: NO

## 2025-03-05 NOTE — PROGRESS NOTES
"Subjective   Patient ID: Beverley Waggoner is a 60 y.o. female who presents for Foot Ulcer (Patient is in office today for a possible ulcer on her left big toe in between . She noticed it on Saturday . The area is very white . No pain .).    Saturday felt some discomfort between her toes.   Has had athletes foot before but this feels different to her  Uses a foot divider for bunions - had correction years ago but it is recurring.    Not much pain but irritating when rubbing against each other  Has tried neosporin but this morning it is turning white.         Patient Care Team:  Lashanda Ramirez MD as PCP - General (Family Medicine)  Camila Hannah MD as Consulting Physician (Rheumatology)    PMH, PSH, family history and social history were reviewed and updated.    Review of Systems   Constitutional:  Negative for chills and fever.   Skin:  Positive for color change and wound. Negative for pallor and rash.       Objective   /76   Pulse 76   Ht 1.753 m (5' 9\")   Wt 94.3 kg (208 lb)   LMP  (LMP Unknown)   SpO2 98%   BMI 30.72 kg/m²     Physical Exam  Constitutional:       General: She is not in acute distress.     Appearance: Normal appearance. She is not ill-appearing.   Skin:     Findings: Lesion (Cracked skin between left great toe and second toe. Top layer of skin missing between the toes with skin surrounding white in color that is unable to be scraped off.) present.   Neurological:      Mental Status: She is alert and oriented to person, place, and time.   Psychiatric:         Mood and Affect: Mood normal.         Behavior: Behavior normal.         Assessment/Plan   Assessment & Plan  Abrasion of skin    Skin is irritated due to anatomy of the foot causing breakdown of skin between toes. No concern for infection at this time.     White color is most likely from an overabundance of moisture in that area due to the neosporin. Advised stopping neosporin and keeping area clean and dry.   If having pain " from friction in the area she can use desitin or diaper rash cream to ease movement without trapping moisture.     Instructed patient to call office if symptoms do not gradually improve or acutely worsen. Patient verbalized understanding and agrees with plan.          Follow up as scheduled, sooner with any problems or concerns.

## 2025-04-06 DIAGNOSIS — E03.9 HYPOTHYROIDISM, UNSPECIFIED TYPE: ICD-10-CM

## 2025-04-08 RX ORDER — LEVOTHYROXINE SODIUM 125 UG/1
125 TABLET ORAL DAILY
Qty: 90 TABLET | Refills: 1 | Status: SHIPPED | OUTPATIENT
Start: 2025-04-08

## 2025-07-28 ENCOUNTER — TELEPHONE (OUTPATIENT)
Dept: PRIMARY CARE | Facility: CLINIC | Age: 60
End: 2025-07-28
Payer: COMMERCIAL

## 2025-07-28 DIAGNOSIS — E03.9 HYPOTHYROIDISM, UNSPECIFIED TYPE: ICD-10-CM

## 2025-07-28 RX ORDER — LEVOTHYROXINE SODIUM 125 UG/1
125 TABLET ORAL DAILY
Qty: 90 TABLET | Refills: 0 | Status: SHIPPED | OUTPATIENT
Start: 2025-07-28

## 2025-07-28 NOTE — TELEPHONE ENCOUNTER
Patient called on RX line to request a refill of Levothyroxine 125 mcg be forwarded to [facility] in [place].  Patient states she has 10 pills remaining. So she is asking for a small supply to last till her physical appointment on 8/29/2025.  Her last physical was 8/28/2024.  Please advise.     Patient ph# [phone]

## 2025-08-22 LAB
ALBUMIN SERPL-MCNC: 4.4 G/DL (ref 3.6–5.1)
ALP SERPL-CCNC: 90 U/L (ref 37–153)
ALT SERPL-CCNC: 25 U/L (ref 6–29)
ANION GAP SERPL CALCULATED.4IONS-SCNC: 9 MMOL/L (CALC) (ref 7–17)
AST SERPL-CCNC: 20 U/L (ref 10–35)
BASOPHILS # BLD AUTO: 39 CELLS/UL (ref 0–200)
BASOPHILS NFR BLD AUTO: 0.9 %
BILIRUB SERPL-MCNC: 0.5 MG/DL (ref 0.2–1.2)
BUN SERPL-MCNC: 25 MG/DL (ref 7–25)
CALCIUM SERPL-MCNC: 9.9 MG/DL (ref 8.6–10.4)
CHLORIDE SERPL-SCNC: 106 MMOL/L (ref 98–110)
CHOLEST SERPL-MCNC: 249 MG/DL
CHOLEST/HDLC SERPL: 3.2 (CALC)
CO2 SERPL-SCNC: 25 MMOL/L (ref 20–32)
CREAT SERPL-MCNC: 0.77 MG/DL (ref 0.5–1.03)
EGFRCR SERPLBLD CKD-EPI 2021: 89 ML/MIN/1.73M2
EOSINOPHIL # BLD AUTO: 206 CELLS/UL (ref 15–500)
EOSINOPHIL NFR BLD AUTO: 4.8 %
ERYTHROCYTE [DISTWIDTH] IN BLOOD BY AUTOMATED COUNT: 12.4 % (ref 11–15)
GLUCOSE SERPL-MCNC: 104 MG/DL (ref 65–99)
HCT VFR BLD AUTO: 39 % (ref 35–45)
HDLC SERPL-MCNC: 79 MG/DL
HGB BLD-MCNC: 12.7 G/DL (ref 11.7–15.5)
LDLC SERPL CALC-MCNC: 148 MG/DL (CALC)
LYMPHOCYTES # BLD AUTO: 1290 CELLS/UL (ref 850–3900)
LYMPHOCYTES NFR BLD AUTO: 30 %
MCH RBC QN AUTO: 30 PG (ref 27–33)
MCHC RBC AUTO-ENTMCNC: 32.6 G/DL (ref 32–36)
MCV RBC AUTO: 92.2 FL (ref 80–100)
MONOCYTES # BLD AUTO: 378 CELLS/UL (ref 200–950)
MONOCYTES NFR BLD AUTO: 8.8 %
NEUTROPHILS # BLD AUTO: 2387 CELLS/UL (ref 1500–7800)
NEUTROPHILS NFR BLD AUTO: 55.5 %
NONHDLC SERPL-MCNC: 170 MG/DL (CALC)
PLATELET # BLD AUTO: 235 THOUSAND/UL (ref 140–400)
PMV BLD REES-ECKER: 9.8 FL (ref 7.5–12.5)
POTASSIUM SERPL-SCNC: 4.4 MMOL/L (ref 3.5–5.3)
PROT SERPL-MCNC: 6.7 G/DL (ref 6.1–8.1)
RBC # BLD AUTO: 4.23 MILLION/UL (ref 3.8–5.1)
SODIUM SERPL-SCNC: 140 MMOL/L (ref 135–146)
TRIGL SERPL-MCNC: 105 MG/DL
TSH SERPL-ACNC: 2.2 MIU/L (ref 0.4–4.5)
WBC # BLD AUTO: 4.3 THOUSAND/UL (ref 3.8–10.8)

## 2025-08-28 ASSESSMENT — PROMIS GLOBAL HEALTH SCALE
RATE_QUALITY_OF_LIFE: VERY GOOD
CARRYOUT_SOCIAL_ACTIVITIES: VERY GOOD
RATE_AVERAGE_FATIGUE: MILD
RATE_SOCIAL_SATISFACTION: VERY GOOD
CARRYOUT_PHYSICAL_ACTIVITIES: MOSTLY
EMOTIONAL_PROBLEMS: OFTEN
RATE_AVERAGE_PAIN: 1
RATE_GENERAL_HEALTH: GOOD
RATE_PHYSICAL_HEALTH: GOOD
RATE_MENTAL_HEALTH: GOOD

## 2025-08-29 ENCOUNTER — APPOINTMENT (OUTPATIENT)
Dept: PRIMARY CARE | Facility: CLINIC | Age: 60
End: 2025-08-29
Payer: COMMERCIAL

## 2025-08-29 VITALS
OXYGEN SATURATION: 98 % | SYSTOLIC BLOOD PRESSURE: 118 MMHG | WEIGHT: 218 LBS | HEIGHT: 68 IN | HEART RATE: 78 BPM | DIASTOLIC BLOOD PRESSURE: 80 MMHG | BODY MASS INDEX: 33.04 KG/M2

## 2025-08-29 DIAGNOSIS — R73.01 IFG (IMPAIRED FASTING GLUCOSE): ICD-10-CM

## 2025-08-29 DIAGNOSIS — E06.3 HYPOTHYROIDISM DUE TO HASHIMOTO THYROIDITIS: ICD-10-CM

## 2025-08-29 DIAGNOSIS — E78.00 PURE HYPERCHOLESTEROLEMIA: ICD-10-CM

## 2025-08-29 DIAGNOSIS — Z00.00 ANNUAL PHYSICAL EXAM: Primary | ICD-10-CM

## 2025-08-29 PROCEDURE — 1036F TOBACCO NON-USER: CPT | Performed by: STUDENT IN AN ORGANIZED HEALTH CARE EDUCATION/TRAINING PROGRAM

## 2025-08-29 PROCEDURE — 3008F BODY MASS INDEX DOCD: CPT | Performed by: STUDENT IN AN ORGANIZED HEALTH CARE EDUCATION/TRAINING PROGRAM

## 2025-08-29 PROCEDURE — 99396 PREV VISIT EST AGE 40-64: CPT | Performed by: STUDENT IN AN ORGANIZED HEALTH CARE EDUCATION/TRAINING PROGRAM

## 2025-08-29 PROCEDURE — 99213 OFFICE O/P EST LOW 20 MIN: CPT | Performed by: STUDENT IN AN ORGANIZED HEALTH CARE EDUCATION/TRAINING PROGRAM

## 2025-08-29 ASSESSMENT — ENCOUNTER SYMPTOMS
CONSTIPATION: 0
ABDOMINAL PAIN: 0
DIARRHEA: 0
SHORTNESS OF BREATH: 0
TROUBLE SWALLOWING: 0
PALPITATIONS: 0
COUGH: 0
FEVER: 0
CHEST TIGHTNESS: 0
HEADACHES: 0
DIZZINESS: 0
WEAKNESS: 0
BLOOD IN STOOL: 0
LIGHT-HEADEDNESS: 0
DIFFICULTY URINATING: 0
WHEEZING: 0
CHILLS: 0

## 2025-08-29 ASSESSMENT — PATIENT HEALTH QUESTIONNAIRE - PHQ9
1. LITTLE INTEREST OR PLEASURE IN DOING THINGS: NOT AT ALL
2. FEELING DOWN, DEPRESSED OR HOPELESS: NOT AT ALL
SUM OF ALL RESPONSES TO PHQ9 QUESTIONS 1 AND 2: 0

## 2025-08-29 ASSESSMENT — COLUMBIA-SUICIDE SEVERITY RATING SCALE - C-SSRS: 1. IN THE PAST MONTH, HAVE YOU WISHED YOU WERE DEAD OR WISHED YOU COULD GO TO SLEEP AND NOT WAKE UP?: NO

## 2025-08-29 ASSESSMENT — PAIN SCALES - GENERAL: PAINLEVEL_OUTOF10: 0-NO PAIN

## 2026-03-02 ENCOUNTER — APPOINTMENT (OUTPATIENT)
Dept: PRIMARY CARE | Facility: CLINIC | Age: 61
End: 2026-03-02
Payer: COMMERCIAL